# Patient Record
Sex: FEMALE | Race: WHITE | NOT HISPANIC OR LATINO | Employment: UNEMPLOYED | URBAN - METROPOLITAN AREA
[De-identification: names, ages, dates, MRNs, and addresses within clinical notes are randomized per-mention and may not be internally consistent; named-entity substitution may affect disease eponyms.]

---

## 2017-03-27 ENCOUNTER — HOSPITAL ENCOUNTER (OUTPATIENT)
Dept: ONCOLOGY | Facility: CLINIC | Age: 62
Discharge: HOME | End: 2017-03-27
Attending: INTERNAL MEDICINE | Admitting: INTERNAL MEDICINE

## 2017-03-27 LAB
ALBUMIN SERPL-MCNC: 4 G/DL (ref 3.5–4.8)
ALBUMIN/GLOB SERPL: 1.1 {RATIO} (ref 1–1.7)
ALP SERPL-CCNC: 57 IU/L (ref 32–91)
ALT SERPL-CCNC: 21 IU/L (ref 14–54)
ANION GAP SERPL CALC-SCNC: 16.3 MMOL/L (ref 10–20)
AST SERPL-CCNC: 29 IU/L (ref 15–41)
BILIRUB SERPL-MCNC: 0.4 MG/DL (ref 0.3–1.2)
BUN SERPL-MCNC: 10 MG/DL (ref 8–20)
BUN/CREAT SERPL: 14.3 (ref 5.4–26.2)
CALCIUM SERPL-MCNC: 10.1 MG/DL (ref 8.9–10.3)
CHLORIDE SERPL-SCNC: 103 MMOL/L (ref 101–111)
CONV CO2: 27 MMOL/L (ref 22–32)
CONV TOTAL PROTEIN: 7.5 G/DL (ref 6.1–7.9)
CREAT UR-MCNC: 0.7 MG/DL (ref 0.4–1)
GLOBULIN UR ELPH-MCNC: 3.5 G/DL (ref 2.5–3.8)
GLUCOSE SERPL-MCNC: 91 MG/DL (ref 65–99)
POTASSIUM SERPL-SCNC: 4.3 MMOL/L (ref 3.6–5.1)
SODIUM SERPL-SCNC: 142 MMOL/L (ref 136–144)

## 2017-06-02 ENCOUNTER — HOSPITAL ENCOUNTER (OUTPATIENT)
Dept: OTHER | Facility: HOSPITAL | Age: 62
Setting detail: SPECIMEN
Discharge: HOME | End: 2017-06-02
Attending: FAMILY MEDICINE | Admitting: FAMILY MEDICINE

## 2017-06-02 LAB
ALBUMIN SERPL-MCNC: 4.1 G/DL (ref 3.5–4.8)
ALBUMIN/GLOB SERPL: 1.3 {RATIO} (ref 1–1.7)
ALP SERPL-CCNC: 58 IU/L (ref 32–91)
ALT SERPL-CCNC: 18 IU/L (ref 14–54)
ANION GAP SERPL CALC-SCNC: 15.8 MMOL/L (ref 10–20)
AST SERPL-CCNC: 29 IU/L (ref 15–41)
BASOPHILS # BLD AUTO: 0 10*3/UL (ref 0–0.2)
BASOPHILS NFR BLD AUTO: 0 % (ref 0–2)
BILIRUB SERPL-MCNC: 0.5 MG/DL (ref 0.3–1.2)
BUN SERPL-MCNC: 8 MG/DL (ref 8–20)
BUN/CREAT SERPL: 11.4 (ref 5.4–26.2)
CALCIUM SERPL-MCNC: 9.9 MG/DL (ref 8.9–10.3)
CHLORIDE SERPL-SCNC: 103 MMOL/L (ref 101–111)
CHOLEST SERPL-MCNC: 213 MG/DL
CHOLEST/HDLC SERPL: 2.1 {RATIO}
CONV CO2: 26 MMOL/L (ref 22–32)
CONV LDL CHOLESTEROL DIRECT: 97 MG/DL (ref 0–100)
CONV TOTAL PROTEIN: 7.2 G/DL (ref 6.1–7.9)
CREAT UR-MCNC: 0.7 MG/DL (ref 0.4–1)
DIFFERENTIAL METHOD BLD: (no result)
EOSINOPHIL # BLD AUTO: 0.1 10*3/UL (ref 0–0.3)
EOSINOPHIL # BLD AUTO: 1 % (ref 0–3)
ERYTHROCYTE [DISTWIDTH] IN BLOOD BY AUTOMATED COUNT: 13 % (ref 11.5–14.5)
GLOBULIN UR ELPH-MCNC: 3.1 G/DL (ref 2.5–3.8)
GLUCOSE SERPL-MCNC: 94 MG/DL (ref 65–99)
HCT VFR BLD AUTO: 41.5 % (ref 35–49)
HDLC SERPL-MCNC: 104 MG/DL
HGB BLD-MCNC: 13.6 G/DL (ref 12–15)
LDLC/HDLC SERPL: 0.9 {RATIO}
LIPID INTERPRETATION: ABNORMAL
LYMPHOCYTES # BLD AUTO: 1.5 10*3/UL (ref 0.8–4.8)
LYMPHOCYTES NFR BLD AUTO: 19 % (ref 18–42)
MCH RBC QN AUTO: 32.6 PG (ref 26–32)
MCHC RBC AUTO-ENTMCNC: 32.8 G/DL (ref 32–36)
MCV RBC AUTO: 99.2 FL (ref 80–94)
MONOCYTES # BLD AUTO: 0.7 10*3/UL (ref 0.1–1.3)
MONOCYTES NFR BLD AUTO: 9 % (ref 2–11)
NEUTROPHILS # BLD AUTO: 5.3 10*3/UL (ref 2.3–8.6)
NEUTROPHILS NFR BLD AUTO: 71 % (ref 50–75)
NRBC BLD AUTO-RTO: 0 /100{WBCS}
NRBC/RBC NFR BLD MANUAL: 0 10*3/UL
PLATELET # BLD AUTO: 280 10*3/UL (ref 150–450)
PMV BLD AUTO: 9.3 FL (ref 7.4–10.4)
POTASSIUM SERPL-SCNC: 4.8 MMOL/L (ref 3.6–5.1)
RBC # BLD AUTO: 4.19 10*6/UL (ref 4–5.4)
SODIUM SERPL-SCNC: 140 MMOL/L (ref 136–144)
TRIGL SERPL-MCNC: 30 MG/DL
TSH SERPL-ACNC: 0.73 UIU/ML (ref 0.34–5.6)
VIT B12 SERPL-MCNC: 283 PG/ML (ref 180–914)
VLDLC SERPL CALC-MCNC: 12.4 MG/DL
WBC # BLD AUTO: 7.6 10*3/UL (ref 4.5–11.5)

## 2018-05-01 ENCOUNTER — HOSPITAL ENCOUNTER (OUTPATIENT)
Dept: ONCOLOGY | Facility: CLINIC | Age: 63
Setting detail: INFUSION SERIES
Discharge: HOME | End: 2018-05-01
Attending: INTERNAL MEDICINE | Admitting: INTERNAL MEDICINE

## 2018-05-01 ENCOUNTER — CLINICAL SUPPORT (OUTPATIENT)
Dept: ONCOLOGY | Facility: HOSPITAL | Age: 63
End: 2018-05-01

## 2018-05-01 NOTE — PROGRESS NOTES
PATIENTS ONCOLOGY RECORD LOCATED IN [facility]      Subjective     Name:  JESSICA WHEELER     Date:  2018  Address:  [address] [place] [address]  Home: [phone]  :  1955 AGE:  63 y.o.        RECORDS OBTAINED:  Patients Oncology Record is located in [facility]

## 2018-12-18 ENCOUNTER — HOSPITAL ENCOUNTER (OUTPATIENT)
Dept: LAB | Facility: HOSPITAL | Age: 63
Discharge: HOME | End: 2018-12-18
Attending: FAMILY MEDICINE | Admitting: FAMILY MEDICINE

## 2018-12-18 LAB
ALBUMIN SERPL-MCNC: 3.9 G/DL (ref 3.5–4.8)
ALBUMIN/GLOB SERPL: 1.3 {RATIO} (ref 1–1.7)
ALP SERPL-CCNC: 51 IU/L (ref 32–91)
ALT SERPL-CCNC: 18 IU/L (ref 14–54)
ANION GAP SERPL CALC-SCNC: 14 MMOL/L (ref 10–20)
AST SERPL-CCNC: 25 IU/L (ref 15–41)
BASOPHILS # BLD AUTO: 0.1 10*3/UL (ref 0–0.2)
BASOPHILS NFR BLD AUTO: 1 % (ref 0–2)
BILIRUB SERPL-MCNC: 0.3 MG/DL (ref 0.3–1.2)
BUN SERPL-MCNC: 9 MG/DL (ref 8–20)
BUN/CREAT SERPL: 12.9 (ref 5.4–26.2)
CALCIUM SERPL-MCNC: 9.2 MG/DL (ref 8.9–10.3)
CHLORIDE SERPL-SCNC: 104 MMOL/L (ref 101–111)
CHOLEST SERPL-MCNC: 234 MG/DL
CHOLEST/HDLC SERPL: 2.6 {RATIO}
CONV CO2: 23 MMOL/L (ref 22–32)
CONV LDL CHOLESTEROL DIRECT: 146 MG/DL (ref 0–100)
CONV TOTAL PROTEIN: 6.9 G/DL (ref 6.1–7.9)
CREAT UR-MCNC: 0.7 MG/DL (ref 0.4–1)
DIFFERENTIAL METHOD BLD: (no result)
EOSINOPHIL # BLD AUTO: 0.2 10*3/UL (ref 0–0.3)
EOSINOPHIL # BLD AUTO: 4 % (ref 0–3)
ERYTHROCYTE [DISTWIDTH] IN BLOOD BY AUTOMATED COUNT: 13 % (ref 11.5–14.5)
GLOBULIN UR ELPH-MCNC: 3 G/DL (ref 2.5–3.8)
GLUCOSE SERPL-MCNC: 94 MG/DL (ref 65–99)
HCT VFR BLD AUTO: 42.5 % (ref 35–49)
HDLC SERPL-MCNC: 90 MG/DL
HGB BLD-MCNC: 14.1 G/DL (ref 12–15)
LDLC/HDLC SERPL: 1.6 {RATIO}
LIPID INTERPRETATION: ABNORMAL
LYMPHOCYTES # BLD AUTO: 1.3 10*3/UL (ref 0.8–4.8)
LYMPHOCYTES NFR BLD AUTO: 22 % (ref 18–42)
MCH RBC QN AUTO: 32.4 PG (ref 26–32)
MCHC RBC AUTO-ENTMCNC: 33.1 G/DL (ref 32–36)
MCV RBC AUTO: 97.9 FL (ref 80–94)
MONOCYTES # BLD AUTO: 0.7 10*3/UL (ref 0.1–1.3)
MONOCYTES NFR BLD AUTO: 12 % (ref 2–11)
NEUTROPHILS # BLD AUTO: 3.6 10*3/UL (ref 2.3–8.6)
NEUTROPHILS NFR BLD AUTO: 61 % (ref 50–75)
NRBC BLD AUTO-RTO: 0 /100{WBCS}
NRBC/RBC NFR BLD MANUAL: 0 10*3/UL
PLATELET # BLD AUTO: 264 10*3/UL (ref 150–450)
PMV BLD AUTO: 7.4 FL (ref 7.4–10.4)
POTASSIUM SERPL-SCNC: 4 MMOL/L (ref 3.6–5.1)
RBC # BLD AUTO: 4.34 10*6/UL (ref 4–5.4)
SODIUM SERPL-SCNC: 137 MMOL/L (ref 136–144)
TRIGL SERPL-MCNC: 38 MG/DL
TSH SERPL-ACNC: 0.72 UIU/ML (ref 0.34–5.6)
VLDLC SERPL CALC-MCNC: 0 MG/DL
WBC # BLD AUTO: 5.9 10*3/UL (ref 4.5–11.5)

## 2018-12-19 LAB — 25(OH)D3 SERPL-MCNC: 11 NG/ML (ref 30–100)

## 2019-06-04 ENCOUNTER — CONVERSION ENCOUNTER (OUTPATIENT)
Dept: OTHER | Facility: HOSPITAL | Age: 64
End: 2019-06-04

## 2019-06-12 VITALS
OXYGEN SATURATION: 96 % | HEIGHT: 63 IN | HEART RATE: 92 BPM | SYSTOLIC BLOOD PRESSURE: 124 MMHG | BODY MASS INDEX: 17.19 KG/M2 | WEIGHT: 97 LBS | DIASTOLIC BLOOD PRESSURE: 76 MMHG

## 2019-06-13 NOTE — PROGRESS NOTES
Referring Provider:  Willie Leach Jr, MD  Primary Provider:  Hany ADAME MD    CC:  6 mo check up .    History of Present Illness:  Pt in for F/U Fibromyalgia w Anxiety and Depression.  Doing well in general.  Hx of Breast Ca w no Sn of recurrence.      Vital Signs:    Patient Profile:    64 Years Old Female  Height:     63 inches  Weight:     97 pounds  BMI:        17.18     O2 Sat:     96 %  Pulse rate: 92 / minute  BP Sittin / 76  (left arm)    Cuff size:  large      Problems: Active problems were reviewed with the patient during this visit.  Medications: Medications were reviewed with the patient during this visit.  Allergies: Allergies were reviewed with the patient during this visit.        Vitals Entered By: Echo Sheehan CMA (2019 1:53 PM)      Active Medications (reviewed today):  PNEUMOVAX 23 25 MCG/0.5ML INJECTION INJECTABLE (PNEUMOCOCCAL VAC POLYVALENT) Give injection IM x one by trained individual.  ZOLOFT 100 MG ORAL TABLET (SERTRALINE HCL) Take 1 1/2 tabs daily  ACYCLOVIR 800 MG ORAL TABLET (ACYCLOVIR) Take one tab q 8 hrs x 10 days for HSV outbreaks.  CELEBREX 200 MG ORAL CAPSULE (CELECOXIB) Take one cap daily for pain.  VITAMIN D TABLET (CHOLECALCIFEROL TABS)   CALCIUM CARBONATE TABLET (CALCIUM CARBONATE TABS)   TRAMADOL HCL 50 MG ORAL TABLET (TRAMADOL HCL) Take one (1) tablet by mouth two to  three times a day as needed for pain.  FOSAMAX 70 MG ORAL TABLET (ALENDRONATE SODIUM) 1 by mouth weekly    Current Allergies (reviewed today):  * CODEINE (Critical)          Risk Factors:     Smoked Tobacco Use:  Never smoker  Drug use:  no  HIV high-risk behavior:  no  Caffeine use:  <1 drinks per day  Alcohol use:  no  Exercise:  yes     Times per week:  2     Type of Exercise:  walking  Seatbelt use:  100 %  Sun Exposure:  occasionally    Family History Risk Factors:     Family History of MI in females < 65 years old:  no     Family History of MI in males < 55 years old:   no    Previous Tobacco Use: Signed On - 12/03/2018  Smoked Tobacco Use:  Never smoker  Drug use:  no  HIV high-risk behavior:  no  Caffeine use:  <1 drinks per day    Previous Alcohol Use: Signed On - 12/03/2018  Alcohol use:  no  Exercise:  yes     Times per week:  2     Type of Exercise:  walking  Seatbelt use:  100 %  Sun Exposure:  occasionally    Family History Risk Factors:     Family History of MI in females < 65 years old:  no     Family History of MI in males < 55 years old:  no            Blood Pressure:  Today's BP: 124/76 mm Hg    Labwork:   Most Recent Lab Results:   LDL: 146 mg/dL 12/18/2018  HbA1c: : 5.3 % 06/22/2016      Impression & Recommendations:    Problem # 1:  Fibromyalgia (ICD-729.1) (AEN08-I01.7)  Assessment: Unchanged    Problem # 2:  Osteoarthritis (ICD-715.90) (XKF36-Z59.90)  Assessment: Unchanged    Her updated medication list for this problem includes:     Celebrex 200 Mg Oral Capsule (Celecoxib) ..... Take one cap daily for pain.     Tramadol Hcl 50 Mg Oral Tablet (Tramadol hcl) ..... Take one (1) tablet by mouth two to  three times a day as needed for pain.      Problem # 3:  Anxiety with depression (ICD-300.4) (WMX07-R01.8)  Assessment: Improved    Problem # 4:  Hx of breast cancer (ICD-V10.3) (SAN04-Z74.3)  Assessment: Improved          Patient Instructions:  1)  Labs and Meds reviewed w no changes.  2)  Cont Healthy Heart Diet and Exercise w Vit D3 and Fosamax.  3)  F/U prn or in 6 mo w repeat Preventive Exam and Labs.    Medications:  ACYCLOVIR 800 MG ORAL TABLET (ACYCLOVIR) Take one tab q 8 hrs x 10 days for HSV outbreaks.  #30[Tablet] x 5      Entered and Authorized by:  Willie Leach Jr, MD      Electronically signed by:   Willie Leach Jr, MD on 06/04/2019      Method used:    [facility]               [facility] [place] [address]              [facility] [address]  [id number]              Ph: [phone]              Fax: [phone]      RxID:   [id number]  TRAMADOL HCL 50 MG ORAL TABLET (TRAMADOL HCL) Take one (1) tablet by mouth two to  three times a day as needed for pain.  #90 x 5      Entered and Authorized by:  Willie Leach Jr, MD      Electronically signed by:   Willie eLach Jr, MD on 06/04/2019      Method used:    Faxed to ...              [place] [id number]* (retail)              [address] [place] [address]  [id number]              Ph: [phone]              Fax: [phone]      Note to Pharmacy: Route: ORAL;       RxID:   [id number]  CELEBREX 200 MG ORAL CAPSULE (CELECOXIB) Take one cap daily for pain.  #90[Capsule] x 1      Entered and Authorized by:  Willie Leach Jr, MD      Electronically signed by:   Willie Leach Jr, MD on 06/04/2019      Method used:    Electronically to               [place] [id number]* ([facility])              [address] [place] [address]  [id number]              Ph: [phone]              Fax: [phone]      RxID:   [id number]                  Medication Administration  ]      Electronically signed by Willie Leach Jr, MD on 06/09/2019 at 9:55 PM  ________________________________________________________________________  Clinical Lists Changes    Orders:  [place] 47[address]-[id number] Vst-Est Level IV (CPT-37706) - Signed                          Electronically signed by Willie Leach Jr, MD on 07/08/2019 at 2:09 PM  ________________________________________________________________________       Disclaimer: Converted Note message may not contain all data elements that existed in the legacy source system. Please see [employer] for the original note details.

## 2019-07-02 ENCOUNTER — APPOINTMENT (OUTPATIENT)
Dept: ONCOLOGY | Facility: CLINIC | Age: 64
End: 2019-07-02

## 2019-07-02 ENCOUNTER — APPOINTMENT (OUTPATIENT)
Dept: LAB | Facility: HOSPITAL | Age: 64
End: 2019-07-02

## 2019-07-30 ENCOUNTER — OFFICE VISIT (OUTPATIENT)
Dept: ONCOLOGY | Facility: CLINIC | Age: 64
End: 2019-07-30

## 2019-07-30 ENCOUNTER — APPOINTMENT (OUTPATIENT)
Dept: LAB | Facility: HOSPITAL | Age: 64
End: 2019-07-30

## 2019-07-30 VITALS
DIASTOLIC BLOOD PRESSURE: 67 MMHG | HEIGHT: 63 IN | HEART RATE: 93 BPM | WEIGHT: 98.8 LBS | RESPIRATION RATE: 18 BRPM | TEMPERATURE: 98.4 F | BODY MASS INDEX: 17.5 KG/M2 | SYSTOLIC BLOOD PRESSURE: 115 MMHG

## 2019-07-30 DIAGNOSIS — Z85.3 HISTORY OF BILATERAL BREAST CANCER: Primary | ICD-10-CM

## 2019-07-30 DIAGNOSIS — Z15.01 BRCA2 GENE MUTATION POSITIVE IN FEMALE: ICD-10-CM

## 2019-07-30 DIAGNOSIS — M81.0 AGE-RELATED OSTEOPOROSIS WITHOUT CURRENT PATHOLOGICAL FRACTURE: ICD-10-CM

## 2019-07-30 DIAGNOSIS — Z15.09 BRCA2 GENE MUTATION POSITIVE IN FEMALE: ICD-10-CM

## 2019-07-30 DIAGNOSIS — Z15.02 BRCA2 GENE MUTATION POSITIVE IN FEMALE: ICD-10-CM

## 2019-07-30 LAB
ALBUMIN SERPL-MCNC: 4 G/DL (ref 3.5–4.8)
ALBUMIN/GLOB SERPL: 1.5 G/DL (ref 1–1.7)
ALP SERPL-CCNC: 44 U/L (ref 32–91)
ALT SERPL W P-5'-P-CCNC: 18 U/L (ref 14–54)
ANION GAP SERPL CALCULATED.3IONS-SCNC: 15.1 MMOL/L (ref 5–15)
AST SERPL-CCNC: 23 U/L (ref 15–41)
BILIRUB SERPL-MCNC: 0.5 MG/DL (ref 0.3–1.2)
BUN BLD-MCNC: 8 MG/DL (ref 8–20)
BUN/CREAT SERPL: 11.4 (ref 5.4–26.2)
CALCIUM SPEC-SCNC: 9.4 MG/DL (ref 8.9–10.3)
CHLORIDE SERPL-SCNC: 105 MMOL/L (ref 101–111)
CO2 SERPL-SCNC: 23 MMOL/L (ref 22–32)
CREAT BLD-MCNC: 0.7 MG/DL (ref 0.4–1)
GFR SERPL CREATININE-BSD FRML MDRD: 85 ML/MIN/1.73
GLOBULIN UR ELPH-MCNC: 2.7 GM/DL (ref 2.5–3.8)
GLUCOSE BLD-MCNC: 89 MG/DL (ref 65–99)
POTASSIUM BLD-SCNC: 4.1 MMOL/L (ref 3.6–5.1)
PROT SERPL-MCNC: 6.7 G/DL (ref 6.1–7.9)
SODIUM BLD-SCNC: 139 MMOL/L (ref 136–144)

## 2019-07-30 PROCEDURE — 36415 COLL VENOUS BLD VENIPUNCTURE: CPT | Performed by: INTERNAL MEDICINE

## 2019-07-30 PROCEDURE — 80053 COMPREHEN METABOLIC PANEL: CPT | Performed by: INTERNAL MEDICINE

## 2019-07-30 PROCEDURE — 99213 OFFICE O/P EST LOW 20 MIN: CPT | Performed by: INTERNAL MEDICINE

## 2019-07-30 RX ORDER — ACYCLOVIR 800 MG/1
TABLET ORAL
Refills: 0 | COMMUNITY
Start: 2019-06-04 | End: 2020-06-23 | Stop reason: SDUPTHER

## 2019-07-30 RX ORDER — TRAMADOL HYDROCHLORIDE 50 MG/1
50 TABLET ORAL EVERY 8 HOURS PRN
Refills: 0 | COMMUNITY
Start: 2019-07-20 | End: 2020-02-12 | Stop reason: SDUPTHER

## 2019-07-30 RX ORDER — ALENDRONATE SODIUM 70 MG/1
70 TABLET ORAL
COMMUNITY
Start: 2016-06-21 | End: 2020-02-12 | Stop reason: SDUPTHER

## 2019-07-30 RX ORDER — IBUPROFEN 200 MG
1 CAPSULE ORAL DAILY
COMMUNITY
Start: 2016-06-21

## 2019-07-30 RX ORDER — CELECOXIB 200 MG/1
200 CAPSULE ORAL DAILY
COMMUNITY
Start: 2016-06-21 | End: 2020-02-12 | Stop reason: SDUPTHER

## 2019-07-30 RX ORDER — SERTRALINE HYDROCHLORIDE 100 MG/1
200 TABLET, FILM COATED ORAL DAILY
Refills: 0 | COMMUNITY
Start: 2019-07-20

## 2019-07-30 NOTE — PROGRESS NOTES
Hematology/Oncology Outpatient Follow Up    PATIENT NAME:Charisma Maya  :1955  MRN: [id number]  PRIMARY CARE PHYSICIAN: Willie Leach Jr., MD  REFERRING PHYSICIAN: Willie Leach Jr.*    Chief Complaint   Patient presents with   • Follow-up     for breast cancer        HISTORY OF PRESENT ILLNESS:   1. Bilateral breast cancer diagnosed since .  • Ms. Maya claimed she developed cancer of the right breast in  when her only daughter was 6 months old.  She was treated with a mastectomy followed by chemotherapy consisting of CMF/-16 and tamoxifen.  The tamoxifen was discontinued in .  Her tumor in  was T2N1, ER/TX positive.  She was treated by Dr. Gila Cohen at that time before starting to see Dr. Luisa Cid, when Dr. Cohen left her practice.  She had a right chest wall recurrence in  that was treated with radiation therapy.  In , she developed a left breast cancer that was T1N0, ER positive, but TX negative.  She was treated with a mastectomy.  She then developed left chest wall recurrence in May 2002, HER-2/malissa 3+ strongly positive on subcutaneous nodule from the left chest.  This was treated with radiation therapy.  The patient did not receive chemotherapy per her choice.  Since then, she was followed by Dr. Luisa Cid and developed some change in the left lung that was being followed with CT scans and chest x-rays with suggestions of obtaining a PET scan last year.  As Dr. Cid left her practice, this was never followed through.  The patient saw Dr. Frost, after Dr. Cid left her practice, but she does not want to follow with him any longer.  The patient is being seen for continued follow up of her breast cancer.  • 6/3/04 - CMP (N), CEA < 0.5 (N), CA 27-29 20.7 (N).  • 04 - Chest x-ray, impression bilateral diffuse, increased lung markings consistent with chronic lung disease.  No definite evidence of tumor, mass, or metastatic disease in  the lungs seen.  PET scan, impression whole body nuclear PET imaging study is within normal limits.  • 4/18/05 - CEA < 0.4 (N), CA 27-29 was 8 (N).  • 8/4/05 - Chest x-ray, some fibrosis or scarring in the retrosternal region.  • 4/12/06 - CEA 0.9 (N), CA 27-29 was 17.01 (N).  • 11/14/06 - Chest x-ray, no change from 8/4/05.  Findings suggestive of COPD with scarring in the left, upper lobe.  • 5/23/07 - CEA (N) at 0.4, CA 27-29 (N) at 20, CMP (N).  • 1/7/08 - Chest x-ray with hyperinflated lungs are usually seen with emphysema.  Stable left upper lobe pulmonary scarring.  • 12/10/08 - CEA 1.0 (N), CA 27-29 16 (N).  • 12/3/09 - CA 27-29 12 (N).  • 12/3/09 - Chest x-ray bilateral calcifications.  • 10/20/10 - CA 27-29 (N) 19.  • 11/1/11 - CA 27-29 13 (N).  • 11/28/11 - Chest x-ray with no change from January 2008.  • 6/12/12 - CT of the lumbar spine revealed bulging of the annulus fibrosus below from L3 through S1 at L3-L4 and to a greater extent at L4-L5.  These changes are associated with some ligamentum flavum hypertrophy causing central canal stenosis and L3 and L4 and again slightly greater at L4 and L5.  • 11/15/13 - CT of the chest revealed two small focal bony protrusions and prominence of the pancreatic duct.  Recommended the MRI or CT for further evaluation.  Asymmetric biapical opacity, with greater focal opacity in the apical portion of the right upper lobe.  Given the scarring in the chest and bronchiectasis in this location, this all represents scar.  However recommended follow-up studies to ensure stability.  Tiny 3 mm nodule in the right middle lobe.  Focal air-space opacity in the left upper lobe may be related to post-radiation scar.  • 11/29/13 - MRI of the abdomen revealed no evidence of pancreatic mass, lesions, surrounding inflammation or lymphadenopathy.  No evidence of active disease in the abdomen.  Right kidney to PTOTIC, which could be seen as a variation of anatomy.  • 12/16/14 - CT chest  without contrast with stable 3 mm nodule in the lateral segment of the middle lobe since 11/15/13. Apical pleural parenchymal scarring bilaterally likely at least in part post-treatment change, unchanged since 11/15/13.   • 12/22/14 - Comprehensive metabolic panel with no significant abnormality. TSH 1.386 (0.4-4.5). BRCA 1 and 2 testing revealed the patient to have BRCA 2 heterozygous mutation. Patient advised on genetic counseling.    • 6/21/16 - Comprehensive metabolic panel normal.   • 3/27/17 - WBC 7.5, hemoglobin 14, platelet count 313,000. Patient has decided not to be seen at [facility]. Went over results of her genetic testing again. I told her that she was heterozygous for mutation BRCA-2, consistent with hereditary breast and ovarian cancer syndrome. Patient made aware that lifetime cancer risk due to BRCA-2 mutation was approximately 41-84% and 11-27% risk of ovarian cancer with 5-7% risk of pancreatic cancer. NCCN guidelines were reviewed and patient was offered genetic testing for family members, as her first-degree relatives would have a 50% chance of testing positive for the mutation. She was offered contact at the testing facility for further information if needed.   • 5/1/18 - Review of labs from 6/2/17 ordered by Willie Leach Jr., M.D. revealed Vitamin B12 of 283 (180-914).  ().    2. Osteoporosis diagnosis established December 2007.  • 12/19/07 - DEXA scan of the lumbar spine revealed osteoporosis and of the left hip revealed osteopenia.  • 6/12/12 - CT of the lumbar spine revealed bulging of the annulus fibrosis below from L3 through S1 at L3-L4 and to a greater extent at L4-L5.  These changes are associated with some ligamentum flavum hypertrophy causing central canal stenosis and L3 and L4 and again slightly greater at L4 and L5.  • 4/8/13 - DEXA scan revealed osteoporosis of the hip and spine.    • 11/11 - Patient treated with Reclast 5 mg IV yearly.  • 11/20/13 -  The patient states that the Reclast was stopped by Dr. Villanueva and was restarted on Fosamax.  • 3/21/16 - Patient forgot to have follow-up DEXA scan done.   • 4/12/16 - DEXA scan with osteoporosis of the left hip with max T-score of -2.7 in the neck.   • 3/27/17 - Patient recommended to start back on Reclast. She will discuss that with her PCP.  • 12/18/18 vitamin D 11 ().  Followed by Dr. Riggs.  3. Lung nodules diagnosis established in November of 2013.  • 11/15/13 - CT of the chest revealed two small focal bony protrusions and prominence of the pancreatic duct.  Recommended the MRI or CT for further evaluation.  Asymmetric biapical opacity, with greater focal opacity in the apical portion of the right upper lobe.  Given the scarring in the chest and bronchiectasis in this location, this all represents scar.  However recommended follow-up studies to ensure stability.  Tiny 3 mm nodule in the right middle lobe.  Focal air-space opacity in the left upper lobe may be related to postradiation scar.  • 12/16/14 - CT scan of the chest revealed a stable 3 mm nodule in the lateral segment of the middle lobe, stable since 11/15/13.  Apical pleural parenchyma scarring bilaterally, likely post treatment change, unchanged from 11/15/13.    • 12/22/14 - Discussed result of the CT scan, stable since 2013.  Will check chest x-ray yearly.  • 3/21/16 - Chest x-ray with emphysematous changes noted with superimposed more chronic-appearing changes within the lungs.   • 3/27/17 - Chest x-ray with no acute disease in the chest and no significant change since previous study with chronic lung disease and emphysema.     Past Medical History:   Diagnosis Date   • Breast cancer, BRCA2 positive (CMS/HCC)     BRCA-2 heterozygosity-Bilateral   • Chronic lung disease    • Disorder of vitamin B12     Borderline B12 level   • Emphysema lung (CMS/HCC)    • History of kidney stones    • Migraine headache    • Osteonecrosis (CMS/HCC) 2015     bilateral knees, osteonerosis possibly related to chronic steroid use   • Osteoporosis 2002   • Osteosclerosis    • Seasonal allergies    • Ulcerative colitis (CMS/HCC)        Past Surgical History:   Procedure Laterality Date   • CARPAL TUNNEL RELEASE Bilateral 2000   • CHEST WALL MASS EXCISION Right 04/1993    2 knots removed from chest wall   • COLECTOMY PARTIAL / TOTAL  02/1992    and ileostomy by Dr. Goldstein because of rupture of bowel Feb. 1992   • CREATION / REVISION OF ILEOSTOMY / JEJUNOSTOMY      reversal of ileostomy and J-pouch placement-Dr. Sesay   • IMPLANTATION / REPLACEMENT INFUSION PUMP  2002    placement and removal in 2002   • MASTECTOMY Bilateral    • OOPHORECTOMY  1996    Uterus intact   • TRIGGER FINGER RELEASE Right 2002    right thumb trigger release   • TUBAL ABDOMINAL LIGATION  1991         Current Outpatient Medications:   •  acyclovir (ZOVIRAX) 800 MG tablet, take 1 tablet by mouth every 8 hours for 10 days HSV OUTBREAKS, Disp: , Rfl: 0  •  alendronate (FOSAMAX) 70 MG tablet, FOSAMAX 70 MG TABS, Disp: , Rfl:   •  calcium carbonate (OS-TOSIN) 1250 (500 Ca) MG tablet, CALCIUM CARBONATE TABS, Disp: , Rfl:   •  celecoxib (CELEBREX) 200 MG capsule, CELEBREX 200 MG CAPS, Disp: , Rfl:   •  Cholecalciferol 1000 units tablet, VITAMIN D TABS, Disp: , Rfl:   •  diphenhydrAMINE HCl (BENADRYL ALLERGY PO), Take  by mouth Daily As Needed., Disp: , Rfl:   •  sertraline (ZOLOFT) 100 MG tablet, , Disp: , Rfl: 0  •  traMADol (ULTRAM) 50 MG tablet, , Disp: , Rfl: 0    Allergies   Allergen Reactions   • Codeine Nausea Only       Family History   Problem Relation Age of Onset   • Breast cancer Mother         passed away from metastasis       Cancer-related family history includes Breast cancer in her mother.    Social History     Tobacco Use   • Smoking status: Never Smoker   Substance Use Topics   • Alcohol use: No     Frequency: Never   • Drug use: No       I have reviewed the history of present illness,  "past medical history, family history, social history, lab results, all notes and other records since the patient was last seen on 5/1/18.    SUBJECTIVE: Patient is here for follow up of bilateral breast cancer and BRCA 2 hetro.     Female accompanied patient today.   SUNDAY Brock present during office visit.         REVIEW OF SYSTEMS:  Review of Systems   Constitutional: Negative for chills and fever.   HENT: Negative for ear pain, mouth sores, nosebleeds and sore throat.    Eyes: Negative for photophobia and visual disturbance.   Respiratory: Negative for wheezing and stridor.    Cardiovascular: Negative for chest pain and palpitations.   Gastrointestinal: Negative for abdominal pain, diarrhea, nausea and vomiting.   Endocrine: Negative for cold intolerance and heat intolerance.   Genitourinary: Negative for dysuria and hematuria.   Musculoskeletal: Negative for joint swelling and neck stiffness.   Skin: Negative for color change and rash.   Neurological: Negative for seizures and syncope.   Hematological: Negative for adenopathy.        No obvious bleeding   Psychiatric/Behavioral: Negative for agitation, confusion and hallucinations.       OBJECTIVE:    Vitals:    07/30/19 1451   BP: 115/67   Pulse: 93   Resp: 18   Temp: 98.4 °F (36.9 °C)   Weight: 44.8 kg (98 lb 12.8 oz)   Height: 160 cm (63\")   PainSc: 0-No pain       ECOG  (0) Fully active, able to carry on all predisease performance without restriction    Physical Exam   Constitutional: She is oriented to person, place, and time. No distress.   HENT:   Head: Normocephalic and atraumatic.   Dental fillings.   Eyes: Conjunctivae and EOM are normal. Right eye exhibits no discharge. Left eye exhibits no discharge. No scleral icterus.   Eyeglasses present.    Neck: Normal range of motion. Neck supple. No thyromegaly present.   Cardiovascular: Normal rate, regular rhythm and normal heart sounds. Exam reveals no gallop and no friction rub.   Pulmonary/Chest: " Effort normal. No stridor. No respiratory distress. She has no wheezes.   Abdominal: Soft. Bowel sounds are normal. She exhibits no mass. There is no tenderness. There is no rebound and no guarding.   Musculoskeletal: Normal range of motion. She exhibits no tenderness.   Lymphadenopathy:     She has no cervical adenopathy.   Neurological: She is alert and oriented to person, place, and time. She exhibits normal muscle tone.   Skin: Skin is warm. No rash noted. She is not diaphoretic. No erythema.   Psychiatric: She has a normal mood and affect. Her behavior is normal.   Nursing note and vitals reviewed.      RECENT LABS  WBC   Date Value Ref Range Status   12/18/2018 5.9 4.5 - 11.5 10*3/uL Final     RBC   Date Value Ref Range Status   12/18/2018 4.34 4.00 - 5.40 10*6/uL Final     Hemoglobin   Date Value Ref Range Status   12/18/2018 14.1 12.0 - 15.0 g/dL Final     Hematocrit   Date Value Ref Range Status   12/18/2018 42.5 35 - 49 % Final     MCV   Date Value Ref Range Status   12/18/2018 97.9 (H) 80 - 94 fL Final     MCH   Date Value Ref Range Status   12/18/2018 32.4 (H) 26 - 32 pg Final     MCHC   Date Value Ref Range Status   12/18/2018 33.1 32 - 36 g/dL Final     RDW   Date Value Ref Range Status   12/18/2018 13.0 11.5 - 14.5 % Final     MPV   Date Value Ref Range Status   12/18/2018 7.4 7.4 - 10.4 fL Final     Platelets   Date Value Ref Range Status   12/18/2018 264 150 - 450 10*3/uL Final     Neutrophil Rel %   Date Value Ref Range Status   12/18/2018 61 50 - 75 % Final     Lymphocyte Rel %   Date Value Ref Range Status   12/18/2018 22 18 - 42 % Final     Monocyte Rel %   Date Value Ref Range Status   12/18/2018 12 (H) 2 - 11 % Final     Eosinophil Rel %   Date Value Ref Range Status   12/18/2018 4 (H) 0 - 3 % Final     Basophil Rel %   Date Value Ref Range Status   12/18/2018 1 0 - 2 % Final     Neutrophils Absolute   Date Value Ref Range Status   12/18/2018 3.6 2.3 - 8.6 10*3/uL Final     Lymphocytes  Absolute   Date Value Ref Range Status   12/18/2018 1.3 0.8 - 4.8 10*3/uL Final     Monocytes Absolute   Date Value Ref Range Status   12/18/2018 0.7 0.1 - 1.3 10*3/uL Final     Eosinophils Absolute   Date Value Ref Range Status   12/18/2018 0.2 0.0 - 0.3 10*3/uL Final     Basophils Absolute   Date Value Ref Range Status   12/18/2018 0.1 0 - 0.2 10*3/uL Final     nRBC   Date Value Ref Range Status   12/18/2018 0 0 /100[WBCs] Final       Lab Results   Component Value Date    GLUCOSE 94 12/18/2018    BUN 9 12/18/2018    CREATININE 0.7 12/18/2018    BCR 12.9 12/18/2018    K 4.0 12/18/2018    CO2 23 12/18/2018    CALCIUM 9.2 12/18/2018    ALBUMIN 3.9 12/18/2018    LABIL2 1.3 12/18/2018    AST 25 12/18/2018    ALT 18 12/18/2018         Assessment/Plan     History of bilateral breast cancer  - CBC & Differential  - Comprehensive Metabolic Panel  - Comprehensive Metabolic Panel    BRCA2 gene mutation positive in female hetro    Age-related osteoporosis without current pathological fracture      ASSESSMENT:    PLAN:  Check CBC and CMP.  Yearly exams with labs.       I have reviewed labs results, imaging, vitals, and medications with the patient today. Will follow up in 1 year.     Patient verbalized understanding and is in agreement of the above plan.    I have reviewed and agree with the above information.   Dao Rebolledo M.D., F.A.C.P.       Much of the above report is an electronic transcription/translation of the spoken language to printed text using Dragon Software. As such, the subtleties and finesse of the spoken language may permit erroneous, or at times, nonsensical words or phrases to be inadvertently transcribed; thus changes may be made at a later date to rectify these errors.

## 2020-02-12 ENCOUNTER — OFFICE VISIT (OUTPATIENT)
Dept: FAMILY MEDICINE CLINIC | Facility: CLINIC | Age: 65
End: 2020-02-12

## 2020-02-12 VITALS
HEART RATE: 104 BPM | SYSTOLIC BLOOD PRESSURE: 122 MMHG | OXYGEN SATURATION: 97 % | WEIGHT: 102 LBS | HEIGHT: 63 IN | BODY MASS INDEX: 18.07 KG/M2 | DIASTOLIC BLOOD PRESSURE: 79 MMHG

## 2020-02-12 DIAGNOSIS — M19.90 ARTHRITIS: ICD-10-CM

## 2020-02-12 DIAGNOSIS — M81.0 AGE-RELATED OSTEOPOROSIS WITHOUT CURRENT PATHOLOGICAL FRACTURE: Primary | ICD-10-CM

## 2020-02-12 DIAGNOSIS — Z85.3 HISTORY OF BREAST CANCER: ICD-10-CM

## 2020-02-12 PROCEDURE — 99214 OFFICE O/P EST MOD 30 MIN: CPT | Performed by: NURSE PRACTITIONER

## 2020-02-12 RX ORDER — TRAMADOL HYDROCHLORIDE 50 MG/1
50 TABLET ORAL EVERY 8 HOURS PRN
Qty: 60 TABLET | Refills: 2 | Status: SHIPPED | OUTPATIENT
Start: 2020-02-12 | End: 2020-05-14

## 2020-02-12 RX ORDER — CELECOXIB 200 MG/1
200 CAPSULE ORAL DAILY
Qty: 30 CAPSULE | Refills: 11 | Status: SHIPPED | OUTPATIENT
Start: 2020-02-12 | End: 2021-03-17 | Stop reason: SDUPTHER

## 2020-02-12 RX ORDER — ALENDRONATE SODIUM 70 MG/1
70 TABLET ORAL
Qty: 4 TABLET | Refills: 11 | Status: SHIPPED | OUTPATIENT
Start: 2020-02-12 | End: 2021-03-17 | Stop reason: SDUPTHER

## 2020-02-27 ENCOUNTER — LAB (OUTPATIENT)
Dept: LAB | Facility: HOSPITAL | Age: 65
End: 2020-02-27

## 2020-02-27 DIAGNOSIS — M81.0 AGE-RELATED OSTEOPOROSIS WITHOUT CURRENT PATHOLOGICAL FRACTURE: ICD-10-CM

## 2020-02-27 LAB
ALBUMIN SERPL-MCNC: 4.4 G/DL (ref 3.5–5.2)
ALBUMIN/GLOB SERPL: 1.6 G/DL
ALP SERPL-CCNC: 52 U/L (ref 39–117)
ALT SERPL W P-5'-P-CCNC: 16 U/L (ref 1–33)
ANION GAP SERPL CALCULATED.3IONS-SCNC: 12.7 MMOL/L (ref 5–15)
AST SERPL-CCNC: 22 U/L (ref 1–32)
BILIRUB SERPL-MCNC: 0.3 MG/DL (ref 0.2–1.2)
BUN BLD-MCNC: 12 MG/DL (ref 8–23)
BUN/CREAT SERPL: 20.7 (ref 7–25)
CALCIUM SPEC-SCNC: 9.1 MG/DL (ref 8.6–10.5)
CHLORIDE SERPL-SCNC: 103 MMOL/L (ref 98–107)
CHOLEST SERPL-MCNC: 239 MG/DL (ref 0–200)
CO2 SERPL-SCNC: 23.3 MMOL/L (ref 22–29)
CREAT BLD-MCNC: 0.58 MG/DL (ref 0.57–1)
DEPRECATED RDW RBC AUTO: 39.4 FL (ref 37–54)
ERYTHROCYTE [DISTWIDTH] IN BLOOD BY AUTOMATED COUNT: 11.4 % (ref 12.3–15.4)
GFR SERPL CREATININE-BSD FRML MDRD: 105 ML/MIN/1.73
GLOBULIN UR ELPH-MCNC: 2.8 GM/DL
GLUCOSE BLD-MCNC: 87 MG/DL (ref 65–99)
HCT VFR BLD AUTO: 39.6 % (ref 34–46.6)
HDLC SERPL-MCNC: 85 MG/DL (ref 40–60)
HGB BLD-MCNC: 13.2 G/DL (ref 12–15.9)
LDLC SERPL CALC-MCNC: 144 MG/DL (ref 0–100)
LDLC/HDLC SERPL: 1.7 {RATIO}
MCH RBC QN AUTO: 31.9 PG (ref 26.6–33)
MCHC RBC AUTO-ENTMCNC: 33.3 G/DL (ref 31.5–35.7)
MCV RBC AUTO: 95.7 FL (ref 79–97)
PLATELET # BLD AUTO: 280 10*3/MM3 (ref 140–450)
PMV BLD AUTO: 9.5 FL (ref 6–12)
POTASSIUM BLD-SCNC: 4.3 MMOL/L (ref 3.5–5.2)
PROT SERPL-MCNC: 7.2 G/DL (ref 6–8.5)
RBC # BLD AUTO: 4.14 10*6/MM3 (ref 3.77–5.28)
SODIUM BLD-SCNC: 139 MMOL/L (ref 136–145)
TRIGL SERPL-MCNC: 48 MG/DL (ref 0–150)
TSH SERPL DL<=0.05 MIU/L-ACNC: 1.2 UIU/ML (ref 0.27–4.2)
VLDLC SERPL-MCNC: 9.6 MG/DL (ref 5–40)
WBC NRBC COR # BLD: 6.86 10*3/MM3 (ref 3.4–10.8)

## 2020-02-27 PROCEDURE — 80053 COMPREHEN METABOLIC PANEL: CPT

## 2020-02-27 PROCEDURE — 80061 LIPID PANEL: CPT

## 2020-02-27 PROCEDURE — 84443 ASSAY THYROID STIM HORMONE: CPT

## 2020-02-27 PROCEDURE — 85027 COMPLETE CBC AUTOMATED: CPT

## 2020-02-27 PROCEDURE — 36415 COLL VENOUS BLD VENIPUNCTURE: CPT

## 2020-02-28 RX ORDER — ROSUVASTATIN CALCIUM 10 MG/1
10 TABLET, COATED ORAL NIGHTLY
Qty: 90 TABLET | Refills: 1 | Status: SHIPPED | OUTPATIENT
Start: 2020-02-28 | End: 2021-03-17

## 2020-03-02 DIAGNOSIS — E78.5 HYPERLIPIDEMIA, UNSPECIFIED HYPERLIPIDEMIA TYPE: Primary | ICD-10-CM

## 2020-05-12 DIAGNOSIS — M19.90 ARTHRITIS: ICD-10-CM

## 2020-05-14 RX ORDER — TRAMADOL HYDROCHLORIDE 50 MG/1
TABLET ORAL
Qty: 60 TABLET | Refills: 2 | Status: SHIPPED | OUTPATIENT
Start: 2020-05-14 | End: 2020-08-12 | Stop reason: SDUPTHER

## 2020-06-23 RX ORDER — ACYCLOVIR 800 MG/1
TABLET ORAL
Qty: 30 TABLET | Refills: 0 | Status: SHIPPED | OUTPATIENT
Start: 2020-06-23 | End: 2021-10-05 | Stop reason: SDUPTHER

## 2020-08-12 ENCOUNTER — OFFICE VISIT (OUTPATIENT)
Dept: FAMILY MEDICINE CLINIC | Facility: CLINIC | Age: 65
End: 2020-08-12

## 2020-08-12 VITALS
WEIGHT: 99.8 LBS | SYSTOLIC BLOOD PRESSURE: 117 MMHG | OXYGEN SATURATION: 98 % | TEMPERATURE: 97.3 F | HEIGHT: 63 IN | BODY MASS INDEX: 17.68 KG/M2 | DIASTOLIC BLOOD PRESSURE: 71 MMHG | HEART RATE: 86 BPM

## 2020-08-12 DIAGNOSIS — M19.90 ARTHRITIS: ICD-10-CM

## 2020-08-12 DIAGNOSIS — M81.0 AGE-RELATED OSTEOPOROSIS WITHOUT CURRENT PATHOLOGICAL FRACTURE: Primary | ICD-10-CM

## 2020-08-12 DIAGNOSIS — E78.5 HYPERLIPIDEMIA, UNSPECIFIED HYPERLIPIDEMIA TYPE: ICD-10-CM

## 2020-08-12 DIAGNOSIS — Z85.3 HISTORY OF BREAST CANCER: ICD-10-CM

## 2020-08-12 DIAGNOSIS — F32.A DEPRESSION, UNSPECIFIED DEPRESSION TYPE: ICD-10-CM

## 2020-08-12 PROCEDURE — 99214 OFFICE O/P EST MOD 30 MIN: CPT | Performed by: NURSE PRACTITIONER

## 2020-08-12 RX ORDER — TRAMADOL HYDROCHLORIDE 50 MG/1
50 TABLET ORAL EVERY 8 HOURS PRN
Qty: 60 TABLET | Refills: 2 | Status: SHIPPED | OUTPATIENT
Start: 2020-08-12 | End: 2020-11-13

## 2020-08-12 NOTE — PROGRESS NOTES
Chief Complaint   Patient presents with   • Osteoporosis   • Follow-up     6 month   • Med Refill       HPI    Hx of Breast cancer B mastectomy and returned in chest wall, radiation/chemo caused bowel issues, illeostomy, has J pouch now. Follows onc with Dr. Palomares yearly, gastro every 2 years     Arthritis, ankles, hips, knees. On celebrex daily and tramadol prn. Saw rhuem in past     OP, on fosamax and calcium, last dexa >2 years ago    Hyperlipidemia, pt did not start crestor, would rather not start a statin, needs to have labs updated, she denies constipation, diarrhea, GI upset, fatigue, chest pain/pressure, exercise intolerance, dyspnea, palpitations, syncope and pedal edema.       The following portions of the patient's history were reviewed and updated as appropriate: allergies, current medications, past family history, past medical history, past social history, past surgical history and problem list.    Past Medical History:   Diagnosis Date   • Breast cancer, BRCA2 positive (CMS/[facility] [place])    • Chronic lung disease    • Disorder of vitamin B12    • Emphysema lung (CMS/HCC)    • History of kidney stones    • Migraine headache    • Osteonecrosis (CMS/HCC) 2015   • Osteoporosis 2002   • Osteosclerosis    • Seasonal allergies    • Ulcerative colitis (CMS/HCC)      Past Surgical History:   Procedure Laterality Date   • CARPAL TUNNEL RELEASE Bilateral 2000   • CHEST WALL MASS EXCISION Right 04/1993    2 knots removed from chest wall   • COLECTOMY PARTIAL / TOTAL  02/1992    and ileostomy by Dr. Goldstein because of rupture of bowel Feb. 1992   • CREATION / REVISION OF ILEOSTOMY / JEJUNOSTOMY      reversal of ileostomy and J-pouch placement-Dr. Sesay   • IMPLANTATION / REPLACEMENT INFUSION PUMP  2002    placement and removal in 2002   • MASTECTOMY Bilateral    • OOPHORECTOMY  1996    Uterus intact   • TRIGGER FINGER RELEASE Right 2002    right thumb trigger release   • TUBAL ABDOMINAL LIGATION  1991     Family  "History   Problem Relation Age of Onset   • Breast cancer Mother         passed away from metastasis     Social History     Tobacco Use   • Smoking status: Never Smoker   Substance Use Topics   • Alcohol use: No     Frequency: Never         Current Outpatient Medications:   •  acyclovir (ZOVIRAX) 800 MG tablet, TAKE 1 TABLET BY MOUTH EVERY 8 HOURS FOR 10 DAYS FOR HSV OUTBREAKS., Disp: 30 tablet, Rfl: 0  •  alendronate (FOSAMAX) 70 MG tablet, Take 1 tablet by mouth Every 7 (Seven) Days., Disp: 4 tablet, Rfl: 11  •  calcium carbonate (OS-TOSIN) 1250 (500 Ca) MG tablet, Take 1 tablet by mouth Daily., Disp: , Rfl:   •  celecoxib (CELEBREX) 200 MG capsule, Take 1 capsule by mouth Daily., Disp: 30 capsule, Rfl: 11  •  Cholecalciferol 1000 units tablet, Take 1,000 Units by mouth Daily., Disp: , Rfl:   •  diphenhydrAMINE HCl (BENADRYL ALLERGY PO), Take  by mouth Daily As Needed., Disp: , Rfl:   •  rosuvastatin (CRESTOR) 10 MG tablet, Take 1 tablet by mouth Every Night., Disp: 90 tablet, Rfl: 1  •  sertraline (ZOLOFT) 100 MG tablet, Take 100 mg by mouth Daily. #45, Disp: , Rfl: 0  •  traMADol (ULTRAM) 50 MG tablet, Take 1 tablet by mouth Every 8 (Eight) Hours As Needed for Severe Pain ., Disp: 60 tablet, Rfl: 2      Review of Systems       Obtained as mentioned in HPI, otherwise negative.       Vitals:    08/12/20 1610   BP: 117/71   BP Location: Left arm   Patient Position: Sitting   Cuff Size: Small Adult   Pulse: 86   Temp: 97.3 °F (36.3 °C)   TempSrc: Skin   SpO2: 98%   Weight: 45.3 kg (99 lb 12.8 oz)   Height: 160 cm (62.99\")     Body mass index is 17.68 kg/m².    Physical Exam   Constitutional: She is oriented to person, place, and time. She appears well-developed and well-nourished. No distress.   Eyes: Pupils are equal, round, and reactive to light.   Neck: Normal range of motion. Neck supple. No JVD present. No thyromegaly present.   Cardiovascular: Normal rate, regular rhythm, normal heart sounds and intact distal " pulses.   No murmur heard.  Pulmonary/Chest: Effort normal and breath sounds normal. No respiratory distress.   Abdominal: Soft. Bowel sounds are normal. She exhibits no distension. There is no tenderness.   Musculoskeletal: Normal range of motion. She exhibits tenderness (multi joint arthritis, ttp, good rom).   Neurological: She is alert and oriented to person, place, and time. No sensory deficit.   Skin: Skin is warm and dry. She is not diaphoretic.   Psychiatric: Her behavior is normal. Judgment and thought content normal. Her mood appears not anxious. She exhibits a depressed mood.   Nursing note and vitals reviewed.      No visits with results within 7 Day(s) from this visit.   Latest known visit with results is:   Lab on 02/27/2020   Component Date Value Ref Range Status   • Glucose 02/27/2020 87  65 - 99 mg/dL Final   • BUN 02/27/2020 12  8 - 23 mg/dL Final   • Creatinine 02/27/2020 0.58  0.57 - 1.00 mg/dL Final   • Sodium 02/27/2020 139  136 - 145 mmol/L Final   • Potassium 02/27/2020 4.3  3.5 - 5.2 mmol/L Final   • Chloride 02/27/2020 103  98 - 107 mmol/L Final   • CO2 02/27/2020 23.3  22.0 - 29.0 mmol/L Final   • Calcium 02/27/2020 9.1  8.6 - 10.5 mg/dL Final   • Total Protein 02/27/2020 7.2  6.0 - 8.5 g/dL Final   • Albumin 02/27/2020 4.40  3.50 - 5.20 g/dL Final   • ALT (SGPT) 02/27/2020 16  1 - 33 U/L Final   • AST (SGOT) 02/27/2020 22  1 - 32 U/L Final   • Alkaline Phosphatase 02/27/2020 52  39 - 117 U/L Final   • Total Bilirubin 02/27/2020 0.3  0.2 - 1.2 mg/dL Final   • eGFR Non African Amer 02/27/2020 105  >60 mL/min/1.73 Final   • Globulin 02/27/2020 2.8  gm/dL Final   • A/G Ratio 02/27/2020 1.6  g/dL Final   • BUN/Creatinine Ratio 02/27/2020 20.7  7.0 - 25.0 Final   • Anion Gap 02/27/2020 12.7  5.0 - 15.0 mmol/L Final   • WBC 02/27/2020 6.86  3.40 - 10.80 10*3/mm3 Final   • RBC 02/27/2020 4.14  3.77 - 5.28 10*6/mm3 Final   • Hemoglobin 02/27/2020 13.2  12.0 - 15.9 g/dL Final   • Hematocrit  02/27/2020 39.6  34.0 - 46.6 % Final   • MCV 02/27/2020 95.7  79.0 - 97.0 fL Final   • MCH 02/27/2020 31.9  26.6 - 33.0 pg Final   • MCHC 02/27/2020 33.3  31.5 - 35.7 g/dL Final   • RDW 02/27/2020 11.4* 12.3 - 15.4 % Final   • RDW-SD 02/27/2020 39.4  37.0 - 54.0 fl Final   • MPV 02/27/2020 9.5  6.0 - 12.0 fL Final   • Platelets 02/27/2020 280  140 - 450 10*3/mm3 Final   • Total Cholesterol 02/27/2020 239* 0 - 200 mg/dL Final   • Triglycerides 02/27/2020 48  0 - 150 mg/dL Final   • HDL Cholesterol 02/27/2020 85* 40 - 60 mg/dL Final   • LDL Cholesterol  02/27/2020 144* 0 - 100 mg/dL Final   • VLDL Cholesterol 02/27/2020 9.6  5 - 40 mg/dL Final   • LDL/HDL Ratio 02/27/2020 1.70   Final   • TSH 02/27/2020 1.200  0.270 - 4.200 uIU/mL Final       Diagnoses and all orders for this visit:    1. Age-related osteoporosis without current pathological fracture (Primary)    2. Arthritis  -     traMADol (ULTRAM) 50 MG tablet; Take 1 tablet by mouth Every 8 (Eight) Hours As Needed for Severe Pain .  Dispense: 60 tablet; Refill: 2    3. Hyperlipidemia, unspecified hyperlipidemia type    4. History of breast cancer    5. Depression, unspecified depression type        Keep f/u with Oncology yearly  dexa unchanged 2/2020, cont fosamax  Arthritis pain stable, rf tramadol for prn use.   Needs labs updated prior to next appt, she has improved diet, has not started crestor yet.   rtc in 6mo or prn

## 2020-11-13 ENCOUNTER — TELEPHONE (OUTPATIENT)
Dept: FAMILY MEDICINE CLINIC | Facility: CLINIC | Age: 65
End: 2020-11-13

## 2020-11-13 DIAGNOSIS — M19.90 ARTHRITIS: ICD-10-CM

## 2020-11-13 RX ORDER — TRAMADOL HYDROCHLORIDE 50 MG/1
TABLET ORAL
Qty: 60 TABLET | Refills: 2 | Status: SHIPPED | OUTPATIENT
Start: 2020-11-13 | End: 2021-02-15

## 2020-11-13 NOTE — TELEPHONE ENCOUNTER
[facility] pharmacy calling stated that they received the script for Tramadol and they are asking if the #60 is supposed to last 30 days or only 20 days. The directions say to take every 8 hours as needed for pain. Please advise.     Thank you

## 2020-12-09 ENCOUNTER — LAB (OUTPATIENT)
Dept: FAMILY MEDICINE CLINIC | Facility: CLINIC | Age: 65
End: 2020-12-09

## 2020-12-09 DIAGNOSIS — E78.5 HYPERLIPIDEMIA, UNSPECIFIED HYPERLIPIDEMIA TYPE: ICD-10-CM

## 2020-12-09 LAB
DEPRECATED RDW RBC AUTO: 39 FL (ref 37–54)
ERYTHROCYTE [DISTWIDTH] IN BLOOD BY AUTOMATED COUNT: 11.4 % (ref 12.3–15.4)
HCT VFR BLD AUTO: 42.4 % (ref 34–46.6)
HGB BLD-MCNC: 13.8 G/DL (ref 12–15.9)
MCH RBC QN AUTO: 30.8 PG (ref 26.6–33)
MCHC RBC AUTO-ENTMCNC: 32.5 G/DL (ref 31.5–35.7)
MCV RBC AUTO: 94.6 FL (ref 79–97)
PLATELET # BLD AUTO: 289 10*3/MM3 (ref 140–450)
PMV BLD AUTO: 10 FL (ref 6–12)
RBC # BLD AUTO: 4.48 10*6/MM3 (ref 3.77–5.28)
WBC # BLD AUTO: 9.13 10*3/MM3 (ref 3.4–10.8)

## 2020-12-09 PROCEDURE — 85027 COMPLETE CBC AUTOMATED: CPT | Performed by: NURSE PRACTITIONER

## 2020-12-09 PROCEDURE — 36415 COLL VENOUS BLD VENIPUNCTURE: CPT | Performed by: NURSE PRACTITIONER

## 2020-12-09 PROCEDURE — 80061 LIPID PANEL: CPT | Performed by: NURSE PRACTITIONER

## 2020-12-09 PROCEDURE — 80053 COMPREHEN METABOLIC PANEL: CPT | Performed by: NURSE PRACTITIONER

## 2020-12-09 PROCEDURE — 84443 ASSAY THYROID STIM HORMONE: CPT | Performed by: NURSE PRACTITIONER

## 2020-12-10 LAB
ALBUMIN SERPL-MCNC: 4.6 G/DL (ref 3.5–5.2)
ALBUMIN/GLOB SERPL: 1.5 G/DL
ALP SERPL-CCNC: 57 U/L (ref 39–117)
ALT SERPL W P-5'-P-CCNC: 17 U/L (ref 1–33)
ANION GAP SERPL CALCULATED.3IONS-SCNC: 13.7 MMOL/L (ref 5–15)
AST SERPL-CCNC: 25 U/L (ref 1–32)
BILIRUB SERPL-MCNC: 0.4 MG/DL (ref 0–1.2)
BUN SERPL-MCNC: 14 MG/DL (ref 8–23)
BUN/CREAT SERPL: 21.2 (ref 7–25)
CALCIUM SPEC-SCNC: 9.7 MG/DL (ref 8.6–10.5)
CHLORIDE SERPL-SCNC: 100 MMOL/L (ref 98–107)
CHOLEST SERPL-MCNC: 244 MG/DL (ref 0–200)
CO2 SERPL-SCNC: 23.3 MMOL/L (ref 22–29)
CREAT SERPL-MCNC: 0.66 MG/DL (ref 0.57–1)
GFR SERPL CREATININE-BSD FRML MDRD: 90 ML/MIN/1.73
GLOBULIN UR ELPH-MCNC: 3 GM/DL
GLUCOSE SERPL-MCNC: 80 MG/DL (ref 65–99)
HDLC SERPL-MCNC: 98 MG/DL (ref 40–60)
LDLC SERPL CALC-MCNC: 138 MG/DL (ref 0–100)
LDLC/HDLC SERPL: 1.39 {RATIO}
POTASSIUM SERPL-SCNC: 4.1 MMOL/L (ref 3.5–5.2)
PROT SERPL-MCNC: 7.6 G/DL (ref 6–8.5)
SODIUM SERPL-SCNC: 137 MMOL/L (ref 136–145)
TRIGL SERPL-MCNC: 51 MG/DL (ref 0–150)
TSH SERPL DL<=0.05 MIU/L-ACNC: 1.46 UIU/ML (ref 0.27–4.2)
VLDLC SERPL-MCNC: 8 MG/DL (ref 5–40)

## 2021-02-15 DIAGNOSIS — M19.90 ARTHRITIS: ICD-10-CM

## 2021-02-15 RX ORDER — TRAMADOL HYDROCHLORIDE 50 MG/1
TABLET ORAL
Qty: 60 TABLET | Refills: 0 | Status: SHIPPED | OUTPATIENT
Start: 2021-02-15 | End: 2021-03-17 | Stop reason: SDUPTHER

## 2021-03-17 ENCOUNTER — OFFICE VISIT (OUTPATIENT)
Dept: FAMILY MEDICINE CLINIC | Facility: CLINIC | Age: 66
End: 2021-03-17

## 2021-03-17 VITALS
WEIGHT: 102 LBS | HEIGHT: 63 IN | TEMPERATURE: 98.2 F | BODY MASS INDEX: 18.07 KG/M2 | OXYGEN SATURATION: 98 % | SYSTOLIC BLOOD PRESSURE: 119 MMHG | HEART RATE: 88 BPM | DIASTOLIC BLOOD PRESSURE: 72 MMHG

## 2021-03-17 DIAGNOSIS — Z85.3 HISTORY OF BREAST CANCER: ICD-10-CM

## 2021-03-17 DIAGNOSIS — M81.0 AGE-RELATED OSTEOPOROSIS WITHOUT CURRENT PATHOLOGICAL FRACTURE: ICD-10-CM

## 2021-03-17 DIAGNOSIS — F32.A DEPRESSION, UNSPECIFIED DEPRESSION TYPE: ICD-10-CM

## 2021-03-17 DIAGNOSIS — M19.90 ARTHRITIS: ICD-10-CM

## 2021-03-17 DIAGNOSIS — E78.5 HYPERLIPIDEMIA, UNSPECIFIED HYPERLIPIDEMIA TYPE: Primary | ICD-10-CM

## 2021-03-17 DIAGNOSIS — Z00.00 PREVENTATIVE HEALTH CARE: ICD-10-CM

## 2021-03-17 PROCEDURE — 86803 HEPATITIS C AB TEST: CPT | Performed by: NURSE PRACTITIONER

## 2021-03-17 PROCEDURE — 84443 ASSAY THYROID STIM HORMONE: CPT | Performed by: NURSE PRACTITIONER

## 2021-03-17 PROCEDURE — 85027 COMPLETE CBC AUTOMATED: CPT | Performed by: NURSE PRACTITIONER

## 2021-03-17 PROCEDURE — 99396 PREV VISIT EST AGE 40-64: CPT | Performed by: NURSE PRACTITIONER

## 2021-03-17 PROCEDURE — 80053 COMPREHEN METABOLIC PANEL: CPT | Performed by: NURSE PRACTITIONER

## 2021-03-17 PROCEDURE — 36415 COLL VENOUS BLD VENIPUNCTURE: CPT | Performed by: NURSE PRACTITIONER

## 2021-03-17 PROCEDURE — 80061 LIPID PANEL: CPT | Performed by: NURSE PRACTITIONER

## 2021-03-17 RX ORDER — TRAMADOL HYDROCHLORIDE 50 MG/1
50 TABLET ORAL EVERY 8 HOURS PRN
Qty: 60 TABLET | Refills: 2 | Status: SHIPPED | OUTPATIENT
Start: 2021-03-17 | End: 2021-06-25

## 2021-03-17 RX ORDER — CELECOXIB 200 MG/1
200 CAPSULE ORAL DAILY
Qty: 30 CAPSULE | Refills: 11 | Status: SHIPPED | OUTPATIENT
Start: 2021-03-17 | End: 2022-04-27 | Stop reason: SDUPTHER

## 2021-03-17 RX ORDER — ALENDRONATE SODIUM 70 MG/1
70 TABLET ORAL
Qty: 4 TABLET | Refills: 11 | Status: SHIPPED | OUTPATIENT
Start: 2021-03-17 | End: 2022-04-27 | Stop reason: SDUPTHER

## 2021-03-17 NOTE — PROGRESS NOTES
Chief Complaint  Annual Exam    Subjective          Charisma Maya presents to [facility] for   History of Present Illness     Patient presents for yearly exam    Hx of Breast cancer, underwent B mastectomy, recurrence in chest wall, udnerwent radiation/chemo with bowel disturbance, required illeostomy, then J pouch. Follows with oncology, Dr. Palomares yearly, gastro every 2 years, sigmoidoscopy has been stretched out to 7 to 8 years now.     Arthritis, stable on Celebrex and as needed tramadol, pain reported in ankles, hips, knees. Saw rhuem in past     OP, on fosamax and calcium, last dexa 6/2/2020, unchanged from previous     Hyperlipidemia, pt refuses statin, has made diet changes and exercising some, she ate at 10am. She denies GI upset, fatigue, chest pain/pressure, exercise intolerance, dyspnea, palpitations, syncope and pedal edema.         The following portions of the patient's history were reviewed and updated as appropriate: allergies, current medications, past family history, past medical history, past social history, past surgical history and problem list.    Past Medical History:   Diagnosis Date   • Breast cancer, BRCA2 positive (CMS/HCC)    • Chronic lung disease    • Disorder of vitamin B12    • Emphysema lung (CMS/HCC)    • History of kidney stones    • Migraine headache    • Osteonecrosis (CMS/HCC) 2015   • Osteoporosis 2002   • Osteosclerosis    • Seasonal allergies    • Ulcerative colitis (CMS/HCC)      Past Surgical History:   Procedure Laterality Date   • CARPAL TUNNEL RELEASE Bilateral 2000   • CHEST WALL MASS EXCISION Right 04/1993    2 knots removed from chest wall   • COLECTOMY PARTIAL / TOTAL  02/1992    and ileostomy by Dr. Goldstein because of rupture of bowel Feb. 1992   • CREATION / REVISION OF ILEOSTOMY / JEJUNOSTOMY      reversal of ileostomy and J-pouch placement-Dr. Sesay   • IMPLANTATION / REPLACEMENT INFUSION PUMP  2002    placement and removal  "in 2002   • MASTECTOMY Bilateral    • OOPHORECTOMY  1996    Uterus intact   • TRIGGER FINGER RELEASE Right 2002    right thumb trigger release   • TUBAL ABDOMINAL LIGATION  1991     Family History   Problem Relation Age of Onset   • Breast cancer Mother         passed away from metastasis     Social History     Tobacco Use   • Smoking status: Never Smoker   • Smokeless tobacco: Never Used   Substance Use Topics   • Alcohol use: No       Current Outpatient Medications:   •  acyclovir (ZOVIRAX) 800 MG tablet, TAKE 1 TABLET BY MOUTH EVERY 8 HOURS FOR 10 DAYS FOR HSV OUTBREAKS., Disp: 30 tablet, Rfl: 0  •  alendronate (Fosamax) 70 MG tablet, Take 1 tablet by mouth Every 7 (Seven) Days., Disp: 4 tablet, Rfl: 11  •  calcium carbonate (OS-TOSIN) 1250 (500 Ca) MG tablet, Take 1 tablet by mouth Daily., Disp: , Rfl:   •  celecoxib (CeleBREX) 200 MG capsule, Take 1 capsule by mouth Daily., Disp: 30 capsule, Rfl: 11  •  Cholecalciferol 1000 units tablet, Take 1,000 Units by mouth Daily., Disp: , Rfl:   •  diphenhydrAMINE HCl (BENADRYL ALLERGY PO), Take  by mouth Daily As Needed., Disp: , Rfl:   •  sertraline (ZOLOFT) 100 MG tablet, Take 100 mg by mouth Daily. #45, Disp: , Rfl: 0  •  traMADol (ULTRAM) 50 MG tablet, Take 1 tablet by mouth Every 8 (Eight) Hours As Needed for Severe Pain ., Disp: 60 tablet, Rfl: 2    Objective   Vital Signs:   /72 (BP Location: Left arm, Patient Position: Sitting, Cuff Size: Adult)   Pulse 88   Temp 98.2 °F (36.8 °C) (Skin)   Ht 160 cm (63\")   Wt 46.3 kg (102 lb)   SpO2 98%   BMI 18.07 kg/m²         Physical Exam  Vitals and nursing note reviewed.   Constitutional:       General: She is not in acute distress.     Appearance: She is well-developed. She is not diaphoretic.   Eyes:      Pupils: Pupils are equal, round, and reactive to light.   Neck:      Thyroid: No thyromegaly.      Vascular: No JVD.   Cardiovascular:      Rate and Rhythm: Normal rate and regular rhythm.      Heart sounds: " Normal heart sounds. No murmur heard.     Pulmonary:      Effort: Pulmonary effort is normal. No respiratory distress.      Breath sounds: Normal breath sounds.   Abdominal:      General: Bowel sounds are normal. There is no distension.      Palpations: Abdomen is soft.      Tenderness: There is no abdominal tenderness.   Musculoskeletal:         General: No tenderness. Normal range of motion.      Cervical back: Normal range of motion and neck supple.   Skin:     General: Skin is warm and dry.   Neurological:      Mental Status: She is alert and oriented to person, place, and time.      Sensory: No sensory deficit.   Psychiatric:         Behavior: Behavior normal.         Thought Content: Thought content normal.         Judgment: Judgment normal.          Result Review :     No visits with results within 7 Day(s) from this visit.   Latest known visit with results is:   Lab on 12/09/2020   Component Date Value Ref Range Status   • WBC 12/09/2020 9.13  3.40 - 10.80 10*3/mm3 Final   • RBC 12/09/2020 4.48  3.77 - 5.28 10*6/mm3 Final   • Hemoglobin 12/09/2020 13.8  12.0 - 15.9 g/dL Final   • Hematocrit 12/09/2020 42.4  34.0 - 46.6 % Final   • MCV 12/09/2020 94.6  79.0 - 97.0 fL Final   • MCH 12/09/2020 30.8  26.6 - 33.0 pg Final   • MCHC 12/09/2020 32.5  31.5 - 35.7 g/dL Final   • RDW 12/09/2020 11.4* 12.3 - 15.4 % Final   • RDW-SD 12/09/2020 39.0  37.0 - 54.0 fl Final   • MPV 12/09/2020 10.0  6.0 - 12.0 fL Final   • Platelets 12/09/2020 289  140 - 450 10*3/mm3 Final   • Glucose 12/09/2020 80  65 - 99 mg/dL Final   • BUN 12/09/2020 14  8 - 23 mg/dL Final   • Creatinine 12/09/2020 0.66  0.57 - 1.00 mg/dL Final   • Sodium 12/09/2020 137  136 - 145 mmol/L Final   • Potassium 12/09/2020 4.1  3.5 - 5.2 mmol/L Final   • Chloride 12/09/2020 100  98 - 107 mmol/L Final   • CO2 12/09/2020 23.3  22.0 - 29.0 mmol/L Final   • Calcium 12/09/2020 9.7  8.6 - 10.5 mg/dL Final   • Total Protein 12/09/2020 7.6  6.0 - 8.5 g/dL Final   •  Albumin 12/09/2020 4.60  3.50 - 5.20 g/dL Final   • ALT (SGPT) 12/09/2020 17  1 - 33 U/L Final   • AST (SGOT) 12/09/2020 25  1 - 32 U/L Final   • Alkaline Phosphatase 12/09/2020 57  39 - 117 U/L Final   • Total Bilirubin 12/09/2020 0.4  0.0 - 1.2 mg/dL Final   • eGFR Non African Amer 12/09/2020 90  >60 mL/min/1.73 Final   • Globulin 12/09/2020 3.0  gm/dL Final   • A/G Ratio 12/09/2020 1.5  g/dL Final   • BUN/Creatinine Ratio 12/09/2020 21.2  7.0 - 25.0 Final   • Anion Gap 12/09/2020 13.7  5.0 - 15.0 mmol/L Final   • TSH 12/09/2020 1.460  0.270 - 4.200 uIU/mL Final   • Total Cholesterol 12/09/2020 244* 0 - 200 mg/dL Final   • Triglycerides 12/09/2020 51  0 - 150 mg/dL Final   • HDL Cholesterol 12/09/2020 98* 40 - 60 mg/dL Final   • LDL Cholesterol  12/09/2020 138* 0 - 100 mg/dL Final   • VLDL Cholesterol 12/09/2020 8  5 - 40 mg/dL Final   • LDL/HDL Ratio 12/09/2020 1.39   Final                       Assessment and Plan    Diagnoses and all orders for this visit:    1. Hyperlipidemia, unspecified hyperlipidemia type (Primary)  Comments:  Reviewed previous lipids with patient, she refuses statin.  However her HDL is high enough that the ratio is low, cont HHD, will notify patient results of labs  Orders:  -     Lipid Panel  -     Comprehensive Metabolic Panel  -     CBC (No Diff)  -     TSH    2. Arthritis  Comments:  Stable on Celebrex and tramadol, refill both.  Discussed with patient to limit use of tramadol  Orders:  -     traMADol (ULTRAM) 50 MG tablet; Take 1 tablet by mouth Every 8 (Eight) Hours As Needed for Severe Pain .  Dispense: 60 tablet; Refill: 2  -     celecoxib (CeleBREX) 200 MG capsule; Take 1 capsule by mouth Daily.  Dispense: 30 capsule; Refill: 11    3. Age-related osteoporosis without current pathological fracture  Comments:  Last DEXA 6/2020, continue Fosamax and calcium/D,   Orders:  -     alendronate (Fosamax) 70 MG tablet; Take 1 tablet by mouth Every 7 (Seven) Days.  Dispense: 4 tablet;  Refill: 11    4. History of breast cancer  Comments:  Follow with oncology as directed    5. Depression, unspecified depression type  Comments:  Stable, follows with psychiatry    6. Preventative health care  Comments:  Has received first Covid vaccine  Recommend shingles vaccines  pt has sigmoidoscopy every 7-8 d/t J pouch    Orders:  -     Hepatitis C Antibody        I spent 25 minutes caring for Charisma on this date of service. This time includes time spent by me in the following activities:preparing for the visit, reviewing tests, performing a medically appropriate examination and/or evaluation , counseling and educating the patient/family/caregiver, ordering medications, tests, or procedures and documenting information in the medical record    Follow Up   Return in about 6 months (around 9/17/2021) for HTN panel 1 week prior to appt.  Patient was given instructions and counseling regarding her condition or for health maintenance advice. Please see specific information pulled into the AVS if appropriate.

## 2021-03-18 LAB
ALBUMIN SERPL-MCNC: 4.8 G/DL (ref 3.5–5.2)
ALBUMIN/GLOB SERPL: 1.7 G/DL
ALP SERPL-CCNC: 61 U/L (ref 39–117)
ALT SERPL W P-5'-P-CCNC: 20 U/L (ref 1–33)
ANION GAP SERPL CALCULATED.3IONS-SCNC: 10.2 MMOL/L (ref 5–15)
AST SERPL-CCNC: 30 U/L (ref 1–32)
BILIRUB SERPL-MCNC: 0.3 MG/DL (ref 0–1.2)
BUN SERPL-MCNC: 13 MG/DL (ref 8–23)
BUN/CREAT SERPL: 22.8 (ref 7–25)
CALCIUM SPEC-SCNC: 9.7 MG/DL (ref 8.6–10.5)
CHLORIDE SERPL-SCNC: 103 MMOL/L (ref 98–107)
CHOLEST SERPL-MCNC: 229 MG/DL (ref 0–200)
CO2 SERPL-SCNC: 25.8 MMOL/L (ref 22–29)
CREAT SERPL-MCNC: 0.57 MG/DL (ref 0.57–1)
DEPRECATED RDW RBC AUTO: 40.8 FL (ref 37–54)
ERYTHROCYTE [DISTWIDTH] IN BLOOD BY AUTOMATED COUNT: 11.6 % (ref 12.3–15.4)
GFR SERPL CREATININE-BSD FRML MDRD: 107 ML/MIN/1.73
GLOBULIN UR ELPH-MCNC: 2.9 GM/DL
GLUCOSE SERPL-MCNC: 89 MG/DL (ref 65–99)
HCT VFR BLD AUTO: 43 % (ref 34–46.6)
HCV AB SER DONR QL: NORMAL
HDLC SERPL-MCNC: 98 MG/DL (ref 40–60)
HGB BLD-MCNC: 13.9 G/DL (ref 12–15.9)
LDLC SERPL CALC-MCNC: 123 MG/DL (ref 0–100)
LDLC/HDLC SERPL: 1.24 {RATIO}
MCH RBC QN AUTO: 31 PG (ref 26.6–33)
MCHC RBC AUTO-ENTMCNC: 32.3 G/DL (ref 31.5–35.7)
MCV RBC AUTO: 95.8 FL (ref 79–97)
PLATELET # BLD AUTO: 306 10*3/MM3 (ref 140–450)
PMV BLD AUTO: 9.8 FL (ref 6–12)
POTASSIUM SERPL-SCNC: 4.6 MMOL/L (ref 3.5–5.2)
PROT SERPL-MCNC: 7.7 G/DL (ref 6–8.5)
RBC # BLD AUTO: 4.49 10*6/MM3 (ref 3.77–5.28)
SODIUM SERPL-SCNC: 139 MMOL/L (ref 136–145)
TRIGL SERPL-MCNC: 47 MG/DL (ref 0–150)
TSH SERPL DL<=0.05 MIU/L-ACNC: 1.11 UIU/ML (ref 0.27–4.2)
VLDLC SERPL-MCNC: 8 MG/DL (ref 5–40)
WBC # BLD AUTO: 7.46 10*3/MM3 (ref 3.4–10.8)

## 2021-06-24 DIAGNOSIS — M19.90 ARTHRITIS: ICD-10-CM

## 2021-06-25 RX ORDER — TRAMADOL HYDROCHLORIDE 50 MG/1
TABLET ORAL
Qty: 60 TABLET | Refills: 2 | Status: SHIPPED | OUTPATIENT
Start: 2021-06-25 | End: 2021-09-22

## 2021-09-21 DIAGNOSIS — M19.90 ARTHRITIS: ICD-10-CM

## 2021-09-22 RX ORDER — TRAMADOL HYDROCHLORIDE 50 MG/1
TABLET ORAL
Qty: 60 TABLET | Refills: 2 | Status: SHIPPED | OUTPATIENT
Start: 2021-09-22 | End: 2021-12-09

## 2021-09-30 ENCOUNTER — OFFICE VISIT (OUTPATIENT)
Dept: FAMILY MEDICINE CLINIC | Facility: CLINIC | Age: 66
End: 2021-09-30

## 2021-09-30 VITALS
SYSTOLIC BLOOD PRESSURE: 121 MMHG | DIASTOLIC BLOOD PRESSURE: 75 MMHG | TEMPERATURE: 98.7 F | BODY MASS INDEX: 18.39 KG/M2 | HEART RATE: 94 BPM | WEIGHT: 103.8 LBS | OXYGEN SATURATION: 98 % | HEIGHT: 63 IN

## 2021-09-30 DIAGNOSIS — E78.2 MIXED HYPERLIPIDEMIA: Primary | ICD-10-CM

## 2021-09-30 DIAGNOSIS — M81.0 AGE-RELATED OSTEOPOROSIS WITHOUT CURRENT PATHOLOGICAL FRACTURE: ICD-10-CM

## 2021-09-30 DIAGNOSIS — M19.90 ARTHRITIS: ICD-10-CM

## 2021-09-30 DIAGNOSIS — Z00.00 PREVENTATIVE HEALTH CARE: ICD-10-CM

## 2021-09-30 DIAGNOSIS — Z85.3 HISTORY OF BREAST CANCER: ICD-10-CM

## 2021-09-30 PROBLEM — F41.8 ANXIETY WITH DEPRESSION: Status: ACTIVE | Noted: 2018-06-07

## 2021-09-30 PROBLEM — M79.7 FIBROMYALGIA: Status: ACTIVE | Noted: 2019-06-04

## 2021-09-30 PROCEDURE — 80048 BASIC METABOLIC PNL TOTAL CA: CPT | Performed by: NURSE PRACTITIONER

## 2021-09-30 PROCEDURE — 80061 LIPID PANEL: CPT | Performed by: NURSE PRACTITIONER

## 2021-09-30 PROCEDURE — 99214 OFFICE O/P EST MOD 30 MIN: CPT | Performed by: NURSE PRACTITIONER

## 2021-09-30 RX ORDER — ALPRAZOLAM 0.25 MG/1
0.25 TABLET ORAL 3 TIMES DAILY PRN
COMMUNITY
Start: 2021-09-09

## 2021-09-30 NOTE — PROGRESS NOTES
Chief Complaint  Hypertension and Follow-up (6 mo)    Subjective          Charisma Maya presents to [facility] for   History of Present Illness     Hx of Breast cancer, B mastectomy, recurrence in chest wall, underwent radiation/chemo with bowel disturbance, required illeostomy, then J pouch. Follows with oncology, Dr. Palomares yearly, gastro every 2 years, sigmoidoscopy now Q 7 to 8 years     Arthritis, stable on Celebrex and as needed tramadol, pain reported in ankles, hips, knees. Saw rhuem in past but no longer. Tramadol recently refilled     OP, on fosamax and calcium, last dexa 6/2/2020, unchanged from previous     Hyperlipidemia, denies GI upset, fatigue, chest pain/pressure, exercise intolerance, dyspnea, palpitations, syncope and pedal edema.       The following portions of the patient's history were reviewed and updated as appropriate: allergies, current medications, past family history, past medical history, past social history, past surgical history and problem list.    Past Medical History:   Diagnosis Date   • Breast cancer, BRCA2 positive (CMS/HCC)    • Chronic lung disease    • Disorder of vitamin B12    • Emphysema lung (CMS/HCC)    • History of kidney stones    • Migraine headache    • Osteonecrosis (CMS/HCC) 2015   • Osteoporosis 2002   • Osteosclerosis    • Seasonal allergies    • Ulcerative colitis (CMS/HCC)      Past Surgical History:   Procedure Laterality Date   • CARPAL TUNNEL RELEASE Bilateral 2000   • CHEST WALL MASS EXCISION Right 04/1993    2 knots removed from chest wall   • COLECTOMY PARTIAL / TOTAL  02/1992    and ileostomy by Dr. Goldstein because of rupture of bowel Feb. 1992   • CREATION / REVISION OF ILEOSTOMY / JEJUNOSTOMY      reversal of ileostomy and J-pouch placement-Dr. Sesay   • IMPLANTATION / REPLACEMENT INFUSION PUMP  2002    placement and removal in 2002   • MASTECTOMY Bilateral    • OOPHORECTOMY  1996    Uterus intact   • TRIGGER FINGER  "RELEASE Right 2002    right thumb trigger release   • TUBAL ABDOMINAL LIGATION  1991     Family History   Problem Relation Age of Onset   • Breast cancer Mother         passed away from metastasis     Social History     Tobacco Use   • Smoking status: Never Smoker   • Smokeless tobacco: Never Used   Substance Use Topics   • Alcohol use: No       Current Outpatient Medications:   •  acyclovir (ZOVIRAX) 800 MG tablet, TAKE 1 TABLET BY MOUTH EVERY 8 HOURS FOR 10 DAYS FOR HSV OUTBREAKS., Disp: 30 tablet, Rfl: 0  •  alendronate (Fosamax) 70 MG tablet, Take 1 tablet by mouth Every 7 (Seven) Days., Disp: 4 tablet, Rfl: 11  •  ALPRAZolam (XANAX) 0.25 MG tablet, Take 0.25 mg by mouth 3 (Three) Times a Day As Needed., Disp: , Rfl:   •  calcium carbonate (OS-TOSIN) 1250 (500 Ca) MG tablet, Take 1 tablet by mouth Daily., Disp: , Rfl:   •  celecoxib (CeleBREX) 200 MG capsule, Take 1 capsule by mouth Daily., Disp: 30 capsule, Rfl: 11  •  Cholecalciferol 1000 units tablet, Take 1,000 Units by mouth Daily., Disp: , Rfl:   •  diphenhydrAMINE HCl (BENADRYL ALLERGY PO), Take  by mouth Daily As Needed., Disp: , Rfl:   •  sertraline (ZOLOFT) 100 MG tablet, Take 200 mg by mouth Daily. #45, Disp: , Rfl: 0  •  traMADol (ULTRAM) 50 MG tablet, TAKE 1 TABLET BY MOUTH EVERY 8 HOURS AS NEEDED FOR SEVERE PAIN, Disp: 60 tablet, Rfl: 2    Objective   Vital Signs:   /75 (BP Location: Left arm, Patient Position: Sitting, Cuff Size: Small Adult)   Pulse 94   Temp 98.7 °F (37.1 °C) (Infrared)   Ht 160 cm (62.99\")   Wt 47.1 kg (103 lb 12.8 oz)   SpO2 98%   BMI 18.39 kg/m²       Physical Exam  Vitals and nursing note reviewed.   Constitutional:       General: She is not in acute distress.     Appearance: She is well-developed. She is not diaphoretic.   Eyes:      Pupils: Pupils are equal, round, and reactive to light.   Neck:      Thyroid: No thyromegaly.      Vascular: No JVD.   Cardiovascular:      Rate and Rhythm: Normal rate and regular " rhythm.      Heart sounds: Normal heart sounds. No murmur heard.     Pulmonary:      Effort: Pulmonary effort is normal. No respiratory distress.      Breath sounds: Normal breath sounds.   Abdominal:      General: Bowel sounds are normal. There is no distension.      Palpations: Abdomen is soft.      Tenderness: There is no abdominal tenderness.   Musculoskeletal:         General: No tenderness. Normal range of motion.      Cervical back: Normal range of motion and neck supple.   Skin:     General: Skin is warm and dry.   Neurological:      Mental Status: She is alert and oriented to person, place, and time.      Sensory: No sensory deficit.   Psychiatric:         Behavior: Behavior normal.         Thought Content: Thought content normal.         Judgment: Judgment normal.          Result Review :     No visits with results within 7 Day(s) from this visit.   Latest known visit with results is:   Office Visit on 03/17/2021   Component Date Value Ref Range Status   • Total Cholesterol 03/17/2021 229* 0 - 200 mg/dL Final   • Triglycerides 03/17/2021 47  0 - 150 mg/dL Final   • HDL Cholesterol 03/17/2021 98* 40 - 60 mg/dL Final   • LDL Cholesterol  03/17/2021 123* 0 - 100 mg/dL Final   • VLDL Cholesterol 03/17/2021 8  5 - 40 mg/dL Final   • LDL/HDL Ratio 03/17/2021 1.24   Final   • Glucose 03/17/2021 89  65 - 99 mg/dL Final   • BUN 03/17/2021 13  8 - 23 mg/dL Final   • Creatinine 03/17/2021 0.57  0.57 - 1.00 mg/dL Final   • Sodium 03/17/2021 139  136 - 145 mmol/L Final   • Potassium 03/17/2021 4.6  3.5 - 5.2 mmol/L Final   • Chloride 03/17/2021 103  98 - 107 mmol/L Final   • CO2 03/17/2021 25.8  22.0 - 29.0 mmol/L Final   • Calcium 03/17/2021 9.7  8.6 - 10.5 mg/dL Final   • Total Protein 03/17/2021 7.7  6.0 - 8.5 g/dL Final   • Albumin 03/17/2021 4.80  3.50 - 5.20 g/dL Final   • ALT (SGPT) 03/17/2021 20  1 - 33 U/L Final   • AST (SGOT) 03/17/2021 30  1 - 32 U/L Final   • Alkaline Phosphatase 03/17/2021 61  39 - 117 U/L  Final   • Total Bilirubin 03/17/2021 0.3  0.0 - 1.2 mg/dL Final   • eGFR Non African Amer 03/17/2021 107  >60 mL/min/1.73 Final   • Globulin 03/17/2021 2.9  gm/dL Final   • A/G Ratio 03/17/2021 1.7  g/dL Final   • BUN/Creatinine Ratio 03/17/2021 22.8  7.0 - 25.0 Final   • Anion Gap 03/17/2021 10.2  5.0 - 15.0 mmol/L Final   • WBC 03/17/2021 7.46  3.40 - 10.80 10*3/mm3 Final   • RBC 03/17/2021 4.49  3.77 - 5.28 10*6/mm3 Final   • Hemoglobin 03/17/2021 13.9  12.0 - 15.9 g/dL Final   • Hematocrit 03/17/2021 43.0  34.0 - 46.6 % Final   • MCV 03/17/2021 95.8  79.0 - 97.0 fL Final   • MCH 03/17/2021 31.0  26.6 - 33.0 pg Final   • MCHC 03/17/2021 32.3  31.5 - 35.7 g/dL Final   • RDW 03/17/2021 11.6* 12.3 - 15.4 % Final   • RDW-SD 03/17/2021 40.8  37.0 - 54.0 fl Final   • MPV 03/17/2021 9.8  6.0 - 12.0 fL Final   • Platelets 03/17/2021 306  140 - 450 10*3/mm3 Final   • TSH 03/17/2021 1.110  0.270 - 4.200 uIU/mL Final   • Hepatitis C Ab 03/17/2021 Non-Reactive  Non-Reactive Final                       Assessment and Plan    Diagnoses and all orders for this visit:    1. Mixed hyperlipidemia (Primary)  Comments:  Previous labs reviewed, low cardiac risk. Lipid panel today, patient is fasting.   Orders:  -     Lipid Panel    2. History of breast cancer  Comments:  Continue following with Dr. Palomares as directed    3. Preventative health care  Comments:  rec pap Q3 years per GYN prev pt of Dr. Bhatt-now retired, pt will sched  labs today as ordered, notify results  rec 2nd shingrix  sigmoidoscopy Q5 yrs    Orders:  -     Basic Metabolic Panel    4. Age-related osteoporosis without current pathological fracture  Comments:  Next DEXA due 2022, continue alendronate and rf.    5. Arthritis  Comments:  Pain stable on Celebrex and as needed tramadol. Celebrex not needed at this time, tramadol refilled on 9/22/2021        I spent 30 minutes caring for Charisma Maya on this date of service. This time includes time spent by me in the  following activities: preparing for the visit, reviewing tests, performing a medically appropriate examination and/or evaluation , counseling and educating the patient/family/caregiver, ordering medications, tests, or procedures and documenting information in the medical record        Follow Up     Return in about 6 months (around 3/30/2022) for OP/OA, HLD.  Patient was given instructions and counseling regarding her condition or for health maintenance advice. Please see specific information pulled into the AVS if appropriate.      EMR Dragon transcription disclaimer:  Some of this encounter note is an electronic transcription translation of spoken language to printed text. The electronic translation of spoken language may permit erroneous, or at times, nonsensical words or phrases to be inadvertently transcribed; Although I have reviewed the note for such errors some may still exist.

## 2021-09-30 NOTE — PROGRESS NOTES
Venipuncture Blood Specimen Collection  Venipuncture performed in left arm by Kamilla Manriquez MA with good hemostasis. Patient tolerated the procedure well without complications.   09/30/21   Kamilla Manriquez MA

## 2021-10-01 LAB
ANION GAP SERPL CALCULATED.3IONS-SCNC: 11.3 MMOL/L (ref 5–15)
BUN SERPL-MCNC: 12 MG/DL (ref 8–23)
BUN/CREAT SERPL: 16.7 (ref 7–25)
CALCIUM SPEC-SCNC: 9.6 MG/DL (ref 8.6–10.5)
CHLORIDE SERPL-SCNC: 103 MMOL/L (ref 98–107)
CHOLEST SERPL-MCNC: 256 MG/DL (ref 0–200)
CO2 SERPL-SCNC: 23.7 MMOL/L (ref 22–29)
CREAT SERPL-MCNC: 0.72 MG/DL (ref 0.57–1)
GFR SERPL CREATININE-BSD FRML MDRD: 82 ML/MIN/1.73
GLUCOSE SERPL-MCNC: 80 MG/DL (ref 65–99)
HDLC SERPL-MCNC: 88 MG/DL (ref 40–60)
LDLC SERPL CALC-MCNC: 161 MG/DL (ref 0–100)
LDLC/HDLC SERPL: 1.8 {RATIO}
POTASSIUM SERPL-SCNC: 3.8 MMOL/L (ref 3.5–5.2)
SODIUM SERPL-SCNC: 138 MMOL/L (ref 136–145)
TRIGL SERPL-MCNC: 47 MG/DL (ref 0–150)
VLDLC SERPL-MCNC: 7 MG/DL (ref 5–40)

## 2021-10-05 RX ORDER — ACYCLOVIR 800 MG/1
TABLET ORAL
Qty: 30 TABLET | Refills: 0 | Status: SHIPPED | OUTPATIENT
Start: 2021-10-05 | End: 2022-09-01

## 2021-10-05 NOTE — TELEPHONE ENCOUNTER
Caller: Charisma Maya    Relationship: Self      Medication requested (name and dosage): acyclovir (ZOVIRAX) 800 MG tablet  Pharmacy where request should be sent: [id number] [place] [id number] [facility] [address] [place] [address] - [phone]  - [phone]   [phone]      Does the patient have less than a 3 day supply:  [] Yes  [] No    Ramón Barrett Rep   10/05/21 10:59 EDT

## 2021-12-09 DIAGNOSIS — M19.90 ARTHRITIS: ICD-10-CM

## 2021-12-09 RX ORDER — TRAMADOL HYDROCHLORIDE 50 MG/1
TABLET ORAL
Qty: 60 TABLET | Refills: 2 | Status: SHIPPED | OUTPATIENT
Start: 2021-12-09 | End: 2022-02-18

## 2021-12-09 NOTE — TELEPHONE ENCOUNTER
Rx Refill Note  Requested Prescriptions     Pending Prescriptions Disp Refills   • traMADol (ULTRAM) 50 MG tablet [Pharmacy Med Name: TRAMADOL 50MG TABLETS] 60 tablet      Sig: TAKE 1 TABLET BY MOUTH EVERY 8 HOURS AS NEEDED FOR SEVERE PAIN      Last office visit with prescribing clinician: 9/30/2021      Next office visit with prescribing clinician: 3/30/2022            Kamilla Manriquez MA  12/09/21, 12:09 EST

## 2022-02-10 ENCOUNTER — TELEPHONE (OUTPATIENT)
Dept: FAMILY MEDICINE CLINIC | Facility: CLINIC | Age: 67
End: 2022-02-10

## 2022-02-18 DIAGNOSIS — M19.90 ARTHRITIS: ICD-10-CM

## 2022-02-18 RX ORDER — TRAMADOL HYDROCHLORIDE 50 MG/1
TABLET ORAL
Qty: 60 TABLET | Refills: 0 | Status: SHIPPED | OUTPATIENT
Start: 2022-02-18 | End: 2022-03-31

## 2022-03-31 DIAGNOSIS — M19.90 ARTHRITIS: ICD-10-CM

## 2022-03-31 RX ORDER — TRAMADOL HYDROCHLORIDE 50 MG/1
TABLET ORAL
Qty: 60 TABLET | Refills: 0 | Status: SHIPPED | OUTPATIENT
Start: 2022-03-31 | End: 2022-04-27 | Stop reason: SDUPTHER

## 2022-03-31 NOTE — TELEPHONE ENCOUNTER
Rx Refill Note  Requested Prescriptions     Pending Prescriptions Disp Refills   • traMADol (ULTRAM) 50 MG tablet [Pharmacy Med Name: TRAMADOL 50MG TABLETS] 60 tablet 0     Sig: TAKE 1 TABLET BY MOUTH EVERY 8 HOURS AS NEEDED FOR SEVERE PAIN      Last office visit with prescribing clinician: 9/30/2021      Next office visit with prescribing clinician: Visit date not found            Kamilla Manriquez MA  03/31/22, 12:19 EDT

## 2022-04-27 ENCOUNTER — OFFICE VISIT (OUTPATIENT)
Dept: FAMILY MEDICINE CLINIC | Facility: CLINIC | Age: 67
End: 2022-04-27

## 2022-04-27 VITALS
SYSTOLIC BLOOD PRESSURE: 132 MMHG | HEIGHT: 63 IN | BODY MASS INDEX: 17.65 KG/M2 | WEIGHT: 99.6 LBS | OXYGEN SATURATION: 97 % | HEART RATE: 95 BPM | DIASTOLIC BLOOD PRESSURE: 84 MMHG

## 2022-04-27 DIAGNOSIS — E78.2 MIXED HYPERLIPIDEMIA: Primary | ICD-10-CM

## 2022-04-27 DIAGNOSIS — Z00.00 PREVENTATIVE HEALTH CARE: ICD-10-CM

## 2022-04-27 DIAGNOSIS — M81.0 AGE-RELATED OSTEOPOROSIS WITHOUT CURRENT PATHOLOGICAL FRACTURE: ICD-10-CM

## 2022-04-27 DIAGNOSIS — Z85.3 HISTORY OF BREAST CANCER: ICD-10-CM

## 2022-04-27 DIAGNOSIS — M19.90 ARTHRITIS: ICD-10-CM

## 2022-04-27 PROCEDURE — 90732 PPSV23 VACC 2 YRS+ SUBQ/IM: CPT | Performed by: NURSE PRACTITIONER

## 2022-04-27 PROCEDURE — 99214 OFFICE O/P EST MOD 30 MIN: CPT | Performed by: NURSE PRACTITIONER

## 2022-04-27 PROCEDURE — G0009 ADMIN PNEUMOCOCCAL VACCINE: HCPCS | Performed by: NURSE PRACTITIONER

## 2022-04-27 RX ORDER — TRAMADOL HYDROCHLORIDE 50 MG/1
50 TABLET ORAL EVERY 8 HOURS PRN
Qty: 60 TABLET | Refills: 2 | Status: SHIPPED | OUTPATIENT
Start: 2022-04-27 | End: 2022-07-14

## 2022-04-27 RX ORDER — ALENDRONATE SODIUM 70 MG/1
70 TABLET ORAL
Qty: 4 TABLET | Refills: 11 | Status: SHIPPED | OUTPATIENT
Start: 2022-04-27 | End: 2022-04-28

## 2022-04-27 RX ORDER — CELECOXIB 200 MG/1
200 CAPSULE ORAL DAILY
Qty: 30 CAPSULE | Refills: 11 | Status: SHIPPED | OUTPATIENT
Start: 2022-04-27

## 2022-04-27 NOTE — PROGRESS NOTES
Chief Complaint  Chief Complaint   Patient presents with   • Hyperlipidemia   • Osteoporosis   • Follow-up     From 9/30/21; please make note to schedule mwv with next f/u           Subjective          Charisma Maya presents to [facility] for   History of Present Illness       Hx of Breast cancer, B mastectomy, w recurrence in chest wall. underwent radiation/chemo with bowel disturbance, required illeostomy, then J pouch. Follows with oncology, Dr. Palomares yearly, gastro every 2 years, sigmoidoscopy now Q 7 to 8 years     Arthritis, stable on Celebrex and as needed tramadol, pain reported in ankles, hips, knees. Saw rhuem in past but no longer.      OP, on fosamax and calcium, last dexa 6/2/2020, unchanged from previous     Hyperlipidemia, denies GI upset, fatigue, chest pain/pressure, exercise intolerance, dyspnea, palpitations, syncope and pedal edema.         The following portions of the patient's history were reviewed and updated as appropriate: allergies, current medications, past family history, past medical history, past social history, past surgical history and problem list.    Past Medical History:   Diagnosis Date   • Breast cancer, BRCA2 positive (HCC)    • Chronic lung disease    • Disorder of vitamin B12    • Emphysema lung (HCC)    • History of kidney stones    • Migraine headache    • Osteonecrosis (HCC) 2015   • Osteoporosis 2002   • Osteosclerosis    • Seasonal allergies    • Ulcerative colitis (HCC)      Past Surgical History:   Procedure Laterality Date   • CARPAL TUNNEL RELEASE Bilateral 2000   • CHEST WALL MASS EXCISION Right 04/1993    2 knots removed from chest wall   • COLECTOMY PARTIAL / TOTAL  02/1992    and ileostomy by Dr. Goldstein because of rupture of bowel Feb. 1992   • CREATION / REVISION OF ILEOSTOMY / JEJUNOSTOMY      reversal of ileostomy and J-pouch placement-Dr. Sesay   • IMPLANTATION / REPLACEMENT INFUSION PUMP  2002    placement and removal  "in 2002   • MASTECTOMY Bilateral    • OOPHORECTOMY  1996    Uterus intact   • TRIGGER FINGER RELEASE Right 2002    right thumb trigger release   • TUBAL ABDOMINAL LIGATION  1991     Family History   Problem Relation Age of Onset   • Breast cancer Mother         passed away from metastasis     Social History     Tobacco Use   • Smoking status: Never Smoker   • Smokeless tobacco: Never Used   Substance Use Topics   • Alcohol use: No       Current Outpatient Medications:   •  acyclovir (ZOVIRAX) 800 MG tablet, TAKE 1 TABLET BY MOUTH EVERY 8 HOURS FOR 10 DAYS FOR HSV OUTBREAKS., Disp: 30 tablet, Rfl: 0  •  alendronate (Fosamax) 70 MG tablet, Take 1 tablet by mouth Every 7 (Seven) Days., Disp: 4 tablet, Rfl: 11  •  ALPRAZolam (XANAX) 0.25 MG tablet, Take 0.25 mg by mouth 3 (Three) Times a Day As Needed., Disp: , Rfl:   •  calcium carbonate (OS-TOSIN) 1250 (500 Ca) MG tablet, Take 1 tablet by mouth Daily., Disp: , Rfl:   •  celecoxib (CeleBREX) 200 MG capsule, Take 1 capsule by mouth Daily., Disp: 30 capsule, Rfl: 11  •  Cholecalciferol 1000 units tablet, Take 1,000 Units by mouth Daily., Disp: , Rfl:   •  diphenhydrAMINE HCl (BENADRYL ALLERGY PO), Take  by mouth Daily As Needed., Disp: , Rfl:   •  sertraline (ZOLOFT) 100 MG tablet, Take 200 mg by mouth Daily. #45, Disp: , Rfl: 0  •  traMADol (ULTRAM) 50 MG tablet, Take 1 tablet by mouth Every 8 (Eight) Hours As Needed for Severe Pain ., Disp: 60 tablet, Rfl: 2    Objective   Vital Signs:   /84 (BP Location: Left arm, Patient Position: Sitting, Cuff Size: Small Adult)   Pulse 95   Ht 160 cm (62.99\")   Wt 45.2 kg (99 lb 9.6 oz)   SpO2 97%   BMI 17.65 kg/m²           Physical Exam  Vitals and nursing note reviewed.   Constitutional:       General: She is not in acute distress.     Appearance: She is well-developed. She is not diaphoretic.   Eyes:      Pupils: Pupils are equal, round, and reactive to light.   Neck:      Thyroid: No thyromegaly.      Vascular: No " JVD.   Cardiovascular:      Rate and Rhythm: Normal rate and regular rhythm.      Heart sounds: Normal heart sounds. No murmur heard.  Pulmonary:      Effort: Pulmonary effort is normal. No respiratory distress.      Breath sounds: Normal breath sounds.   Abdominal:      General: Bowel sounds are normal. There is no distension.      Palpations: Abdomen is soft.      Tenderness: There is no abdominal tenderness.   Musculoskeletal:         General: No tenderness. Normal range of motion.      Cervical back: Normal range of motion and neck supple.   Skin:     General: Skin is warm and dry.   Neurological:      Mental Status: She is alert and oriented to person, place, and time.      Sensory: No sensory deficit.   Psychiatric:         Behavior: Behavior normal.         Thought Content: Thought content normal.         Judgment: Judgment normal.          Result Review :     No visits with results within 7 Day(s) from this visit.   Latest known visit with results is:   Office Visit on 09/30/2021   Component Date Value Ref Range Status   • Glucose 09/30/2021 80  65 - 99 mg/dL Final   • BUN 09/30/2021 12  8 - 23 mg/dL Final   • Creatinine 09/30/2021 0.72  0.57 - 1.00 mg/dL Final   • Sodium 09/30/2021 138  136 - 145 mmol/L Final   • Potassium 09/30/2021 3.8  3.5 - 5.2 mmol/L Final   • Chloride 09/30/2021 103  98 - 107 mmol/L Final   • CO2 09/30/2021 23.7  22.0 - 29.0 mmol/L Final   • Calcium 09/30/2021 9.6  8.6 - 10.5 mg/dL Final   • eGFR Non  Amer 09/30/2021 82  >60 mL/min/1.73 Final   • BUN/Creatinine Ratio 09/30/2021 16.7  7.0 - 25.0 Final   • Anion Gap 09/30/2021 11.3  5.0 - 15.0 mmol/L Final   • Total Cholesterol 09/30/2021 256 (A) 0 - 200 mg/dL Final   • Triglycerides 09/30/2021 47  0 - 150 mg/dL Final   • HDL Cholesterol 09/30/2021 88 (A) 40 - 60 mg/dL Final   • LDL Cholesterol  09/30/2021 161 (A) 0 - 100 mg/dL Final   • VLDL Cholesterol 09/30/2021 7  5 - 40 mg/dL Final   • LDL/HDL Ratio 09/30/2021 1.80   Final                              Assessment and Plan    Diagnoses and all orders for this visit:    1. Mixed hyperlipidemia (Primary)  Comments:  Previous lipids stable, will check labs with yearly exam in 6 months    2. Age-related osteoporosis without current pathological fracture  Comments:  Last DEXA 6/2020, due and ordered today, continue Fosamax and calcium/D,   Orders:  -     DEXA Bone Density Axial; Future  -     alendronate (Fosamax) 70 MG tablet; Take 1 tablet by mouth Every 7 (Seven) Days.  Dispense: 4 tablet; Refill: 11    3. Arthritis  Comments:  Stable on Celebrex and tramadol, refill both.  Discussed with patient to limit use of tramadol  Orders:  -     celecoxib (CeleBREX) 200 MG capsule; Take 1 capsule by mouth Daily.  Dispense: 30 capsule; Refill: 11  -     traMADol (ULTRAM) 50 MG tablet; Take 1 tablet by mouth Every 8 (Eight) Hours As Needed for Severe Pain .  Dispense: 60 tablet; Refill: 2    4. History of breast cancer    5. Preventative health care  Comments:  Pneumo 23 today    Other orders  -     Pneumococcal Polysaccharide Vaccine 23-Valent Greater Than or Equal To 1yo Subcutaneous / IM        I spent 30 minutes caring for Charisma Maya on this date of service. This time includes time spent by me in the following activities: preparing for the visit, reviewing tests, performing a medically appropriate examination and/or evaluation , counseling and educating the patient/family/caregiver, ordering medications, tests, or procedures and documenting information in the medical record        Follow Up     Return in about 6 months (around 10/27/2022), or if symptoms worsen or fail to improve, for Medicare Wellness. HTN panel prior to appt.  Patient was given instructions and counseling regarding her condition or for health maintenance advice. Please see specific information pulled into the AVS if appropriate.        Part of this note may be an electronic transcription/translation of spoken language to  printed text using the Dragon Dictation System

## 2022-04-28 RX ORDER — ALENDRONATE SODIUM 70 MG/1
TABLET ORAL
Qty: 12 TABLET | Refills: 1 | Status: SHIPPED | OUTPATIENT
Start: 2022-04-28 | End: 2022-10-27 | Stop reason: SDUPTHER

## 2022-06-09 ENCOUNTER — TELEPHONE (OUTPATIENT)
Dept: FAMILY MEDICINE CLINIC | Facility: CLINIC | Age: 67
End: 2022-06-09

## 2022-06-09 NOTE — TELEPHONE ENCOUNTER
I spoke to pt, she said she was calling about a DEXA scan and not a biopsy.  She said it's scheduled and I have faxed the order.

## 2022-06-09 NOTE — TELEPHONE ENCOUNTER
Caller: Charisma Maya    Relationship: Self    Best call back number: [phone]    What is the best time to reach you: ANYTIME    Who are you requesting to speak with (clinical staff, provider,  specific staff member): STEPHAN GUTIERREZ OR MA    What was the call regarding: PATIENT STATES SHE IS GOING TO HAVE THE BIOPSY DONE,JUST HAS NOT DONE IT YET.    Do you require a callback: YES

## 2022-07-14 DIAGNOSIS — M19.90 ARTHRITIS: ICD-10-CM

## 2022-07-14 RX ORDER — TRAMADOL HYDROCHLORIDE 50 MG/1
TABLET ORAL
Qty: 60 TABLET | Refills: 2 | Status: SHIPPED | OUTPATIENT
Start: 2022-07-14 | End: 2022-10-05

## 2022-09-01 RX ORDER — ACYCLOVIR 800 MG/1
TABLET ORAL
Qty: 30 TABLET | Refills: 0 | Status: SHIPPED | OUTPATIENT
Start: 2022-09-01 | End: 2022-10-27 | Stop reason: SDUPTHER

## 2022-09-02 RX ORDER — ACYCLOVIR 800 MG/1
TABLET ORAL
Qty: 30 TABLET | Refills: 0 | OUTPATIENT
Start: 2022-09-02

## 2022-10-04 DIAGNOSIS — M19.90 ARTHRITIS: ICD-10-CM

## 2022-10-05 RX ORDER — TRAMADOL HYDROCHLORIDE 50 MG/1
TABLET ORAL
Qty: 60 TABLET | Refills: 0 | Status: SHIPPED | OUTPATIENT
Start: 2022-10-05 | End: 2022-10-27 | Stop reason: SDUPTHER

## 2022-10-20 ENCOUNTER — CLINICAL SUPPORT (OUTPATIENT)
Dept: FAMILY MEDICINE CLINIC | Facility: CLINIC | Age: 67
End: 2022-10-20

## 2022-10-20 DIAGNOSIS — E78.2 MIXED HYPERLIPIDEMIA: Primary | ICD-10-CM

## 2022-10-20 PROCEDURE — 80053 COMPREHEN METABOLIC PANEL: CPT | Performed by: NURSE PRACTITIONER

## 2022-10-20 PROCEDURE — 36415 COLL VENOUS BLD VENIPUNCTURE: CPT | Performed by: NURSE PRACTITIONER

## 2022-10-20 PROCEDURE — 80061 LIPID PANEL: CPT | Performed by: NURSE PRACTITIONER

## 2022-10-20 PROCEDURE — 84443 ASSAY THYROID STIM HORMONE: CPT | Performed by: NURSE PRACTITIONER

## 2022-10-20 PROCEDURE — 85027 COMPLETE CBC AUTOMATED: CPT | Performed by: NURSE PRACTITIONER

## 2022-10-21 LAB
ALBUMIN SERPL-MCNC: 4 G/DL (ref 3.5–5.2)
ALBUMIN/GLOB SERPL: 1.4 G/DL
ALP SERPL-CCNC: 58 U/L (ref 39–117)
ALT SERPL W P-5'-P-CCNC: 18 U/L (ref 1–33)
ANION GAP SERPL CALCULATED.3IONS-SCNC: 13.8 MMOL/L (ref 5–15)
AST SERPL-CCNC: 23 U/L (ref 1–32)
BILIRUB SERPL-MCNC: 0.2 MG/DL (ref 0–1.2)
BUN SERPL-MCNC: 13 MG/DL (ref 8–23)
BUN/CREAT SERPL: 17.8 (ref 7–25)
CALCIUM SPEC-SCNC: 9.9 MG/DL (ref 8.6–10.5)
CHLORIDE SERPL-SCNC: 103 MMOL/L (ref 98–107)
CHOLEST SERPL-MCNC: 238 MG/DL (ref 0–200)
CO2 SERPL-SCNC: 24.2 MMOL/L (ref 22–29)
CREAT SERPL-MCNC: 0.73 MG/DL (ref 0.57–1)
DEPRECATED RDW RBC AUTO: 41.5 FL (ref 37–54)
EGFRCR SERPLBLD CKD-EPI 2021: 91.4 ML/MIN/1.73
ERYTHROCYTE [DISTWIDTH] IN BLOOD BY AUTOMATED COUNT: 11.8 % (ref 12.3–15.4)
GLOBULIN UR ELPH-MCNC: 2.8 GM/DL
GLUCOSE SERPL-MCNC: 95 MG/DL (ref 65–99)
HCT VFR BLD AUTO: 40.1 % (ref 34–46.6)
HDLC SERPL-MCNC: 97 MG/DL (ref 40–60)
HGB BLD-MCNC: 13.5 G/DL (ref 12–15.9)
LDLC SERPL CALC-MCNC: 134 MG/DL (ref 0–100)
LDLC/HDLC SERPL: 1.36 {RATIO}
MCH RBC QN AUTO: 32.1 PG (ref 26.6–33)
MCHC RBC AUTO-ENTMCNC: 33.7 G/DL (ref 31.5–35.7)
MCV RBC AUTO: 95.5 FL (ref 79–97)
PLATELET # BLD AUTO: 293 10*3/MM3 (ref 140–450)
PMV BLD AUTO: 10 FL (ref 6–12)
POTASSIUM SERPL-SCNC: 4.4 MMOL/L (ref 3.5–5.2)
PROT SERPL-MCNC: 6.8 G/DL (ref 6–8.5)
RBC # BLD AUTO: 4.2 10*6/MM3 (ref 3.77–5.28)
SODIUM SERPL-SCNC: 141 MMOL/L (ref 136–145)
TRIGL SERPL-MCNC: 44 MG/DL (ref 0–150)
TSH SERPL DL<=0.05 MIU/L-ACNC: 1.04 UIU/ML (ref 0.27–4.2)
VLDLC SERPL-MCNC: 7 MG/DL (ref 5–40)
WBC NRBC COR # BLD: 8.72 10*3/MM3 (ref 3.4–10.8)

## 2022-10-27 ENCOUNTER — OFFICE VISIT (OUTPATIENT)
Dept: FAMILY MEDICINE CLINIC | Facility: CLINIC | Age: 67
End: 2022-10-27

## 2022-10-27 VITALS
HEART RATE: 99 BPM | WEIGHT: 105 LBS | BODY MASS INDEX: 18.61 KG/M2 | TEMPERATURE: 98.6 F | OXYGEN SATURATION: 92 % | DIASTOLIC BLOOD PRESSURE: 79 MMHG | SYSTOLIC BLOOD PRESSURE: 132 MMHG | HEIGHT: 63 IN

## 2022-10-27 DIAGNOSIS — M81.0 AGE-RELATED OSTEOPOROSIS WITHOUT CURRENT PATHOLOGICAL FRACTURE: ICD-10-CM

## 2022-10-27 DIAGNOSIS — E78.2 MIXED HYPERLIPIDEMIA: Primary | ICD-10-CM

## 2022-10-27 DIAGNOSIS — M19.90 ARTHRITIS: ICD-10-CM

## 2022-10-27 PROCEDURE — 99213 OFFICE O/P EST LOW 20 MIN: CPT | Performed by: NURSE PRACTITIONER

## 2022-10-27 RX ORDER — TRAMADOL HYDROCHLORIDE 50 MG/1
50 TABLET ORAL EVERY 8 HOURS PRN
Qty: 60 TABLET | Refills: 2 | Status: SHIPPED | OUTPATIENT
Start: 2022-10-27 | End: 2023-01-16

## 2022-10-27 RX ORDER — ALENDRONATE SODIUM 70 MG/1
70 TABLET ORAL
Qty: 12 TABLET | Refills: 1 | Status: SHIPPED | OUTPATIENT
Start: 2022-10-27

## 2022-10-27 RX ORDER — ACYCLOVIR 800 MG/1
TABLET ORAL
Qty: 30 TABLET | Refills: 0 | Status: SHIPPED | OUTPATIENT
Start: 2022-10-27

## 2022-10-27 NOTE — PROGRESS NOTES
Chief Complaint  Chief Complaint   Patient presents with   • Follow-up     6M[facility]            Subjective          Charisma Maya presents to [facility] for   History of Present Illness        Hx of Breast cancer, B mastectomy, w recurrence in chest wall. underwent radiation/chemo with bowel disturbance, required illeostomy, then J pouch. Follows with oncology, Dr. Palomares yearly, gastro every 2 years, sigmoidoscopy now Q 7 to 8 years     Arthritis, stable on Celebrex and tramadol as needed, pain reported in ankles, hips, knees.  No longer follows with rheumatology.  Needs medication refill today     OP, on fosamax and calcium, DEXA updated 6/28/2022, unchanged from previous     Hyperlipidemia, denies GI upset, fatigue, chest pain/pressure, exercise intolerance, dyspnea, palpitations, syncope and pedal edema    She reports her insurance will not cover [facility] until after 12 mo on medicare        The following portions of the patient's history were reviewed and updated as appropriate: allergies, current medications, past family history, past medical history, past social history, past surgical history and problem list.    Past Medical History:   Diagnosis Date   • Breast cancer, BRCA2 positive (HCC)    • Chronic lung disease    • Disorder of vitamin B12    • Emphysema lung (HCC)    • History of kidney stones    • Migraine headache    • Osteonecrosis (HCC) 2015   • Osteoporosis 2002   • Osteosclerosis    • Seasonal allergies    • Ulcerative colitis (HCC)      Past Surgical History:   Procedure Laterality Date   • CARPAL TUNNEL RELEASE Bilateral 2000   • CHEST WALL MASS EXCISION Right 04/1993    2 knots removed from chest wall   • COLECTOMY PARTIAL / TOTAL  02/1992    and ileostomy by Dr. Goldstein because of rupture of bowel Feb. 1992   • CREATION / REVISION OF ILEOSTOMY / JEJUNOSTOMY      reversal of ileostomy and J-pouch placement-Dr. Sesay   • IMPLANTATION / REPLACEMENT INFUSION PUMP  2002  "   placement and removal in 2002   • MASTECTOMY Bilateral    • OOPHORECTOMY  1996    Uterus intact   • TRIGGER FINGER RELEASE Right 2002    right thumb trigger release   • TUBAL ABDOMINAL LIGATION  1991     Family History   Problem Relation Age of Onset   • Breast cancer Mother         passed away from metastasis     Social History     Tobacco Use   • Smoking status: Never   • Smokeless tobacco: Never   Substance Use Topics   • Alcohol use: No       Current Outpatient Medications:   •  acyclovir (ZOVIRAX) 800 MG tablet, TAKE 1 TABLET BY MOUTH EVERY 8 HOURS FOR 10 DAYS, Disp: 30 tablet, Rfl: 0  •  alendronate (FOSAMAX) 70 MG tablet, Take 1 tablet by mouth Every 7 (Seven) Days., Disp: 12 tablet, Rfl: 1  •  ALPRAZolam (XANAX) 0.25 MG tablet, Take 0.25 mg by mouth 3 (Three) Times a Day As Needed., Disp: , Rfl:   •  calcium carbonate (OS-TOSIN) 1250 (500 Ca) MG tablet, Take 1 tablet by mouth Daily., Disp: , Rfl:   •  celecoxib (CeleBREX) 200 MG capsule, Take 1 capsule by mouth Daily., Disp: 30 capsule, Rfl: 11  •  Cholecalciferol 1000 units tablet, Take 1,000 Units by mouth Daily., Disp: , Rfl:   •  diphenhydrAMINE HCl (BENADRYL ALLERGY PO), Take  by mouth Daily As Needed., Disp: , Rfl:   •  sertraline (ZOLOFT) 100 MG tablet, Take 200 mg by mouth Daily. #45, Disp: , Rfl: 0  •  traMADol (ULTRAM) 50 MG tablet, Take 1 tablet by mouth Every 8 (Eight) Hours As Needed for Severe Pain., Disp: 60 tablet, Rfl: 2    Objective   Vital Signs:   /79   Pulse 99   Temp 98.6 °F (37 °C)   Ht 160 cm (62.99\")   Wt 47.6 kg (105 lb)   SpO2 92%   BMI 18.61 kg/m²           Physical Exam  Vitals and nursing note reviewed.   Constitutional:       General: She is not in acute distress.     Appearance: She is well-developed. She is not diaphoretic.   Eyes:      Pupils: Pupils are equal, round, and reactive to light.   Neck:      Thyroid: No thyromegaly.      Vascular: No JVD.   Cardiovascular:      Rate and Rhythm: Normal rate and " regular rhythm.      Heart sounds: Normal heart sounds. No murmur heard.  Pulmonary:      Effort: Pulmonary effort is normal. No respiratory distress.      Breath sounds: Normal breath sounds.   Abdominal:      General: Bowel sounds are normal. There is no distension.      Palpations: Abdomen is soft.      Tenderness: There is no abdominal tenderness.   Musculoskeletal:         General: No tenderness. Normal range of motion.      Cervical back: Normal range of motion and neck supple.   Skin:     General: Skin is warm and dry.   Neurological:      Mental Status: She is alert and oriented to person, place, and time.      Sensory: No sensory deficit.   Psychiatric:         Behavior: Behavior normal.         Thought Content: Thought content normal.         Judgment: Judgment normal.          Result Review :     No visits with results within 7 Day(s) from this visit.   Latest known visit with results is:   Clinical Support on 10/20/2022   Component Date Value Ref Range Status   • WBC 10/20/2022 8.72  3.40 - 10.80 10*3/mm3 Final   • RBC 10/20/2022 4.20  3.77 - 5.28 10*6/mm3 Final   • Hemoglobin 10/20/2022 13.5  12.0 - 15.9 g/dL Final   • Hematocrit 10/20/2022 40.1  34.0 - 46.6 % Final   • MCV 10/20/2022 95.5  79.0 - 97.0 fL Final   • MCH 10/20/2022 32.1  26.6 - 33.0 pg Final   • MCHC 10/20/2022 33.7  31.5 - 35.7 g/dL Final   • RDW 10/20/2022 11.8 (L)  12.3 - 15.4 % Final   • RDW-SD 10/20/2022 41.5  37.0 - 54.0 fl Final   • MPV 10/20/2022 10.0  6.0 - 12.0 fL Final   • Platelets 10/20/2022 293  140 - 450 10*3/mm3 Final   • Glucose 10/20/2022 95  65 - 99 mg/dL Final   • BUN 10/20/2022 13  8 - 23 mg/dL Final   • Creatinine 10/20/2022 0.73  0.57 - 1.00 mg/dL Final   • Sodium 10/20/2022 141  136 - 145 mmol/L Final   • Potassium 10/20/2022 4.4  3.5 - 5.2 mmol/L Final   • Chloride 10/20/2022 103  98 - 107 mmol/L Final   • CO2 10/20/2022 24.2  22.0 - 29.0 mmol/L Final   • Calcium 10/20/2022 9.9  8.6 - 10.5 mg/dL Final   • Total  Protein 10/20/2022 6.8  6.0 - 8.5 g/dL Final   • Albumin 10/20/2022 4.00  3.50 - 5.20 g/dL Final   • ALT (SGPT) 10/20/2022 18  1 - 33 U/L Final   • AST (SGOT) 10/20/2022 23  1 - 32 U/L Final   • Alkaline Phosphatase 10/20/2022 58  39 - 117 U/L Final   • Total Bilirubin 10/20/2022 0.2  0.0 - 1.2 mg/dL Final   • Globulin 10/20/2022 2.8  gm/dL Final   • A/G Ratio 10/20/2022 1.4  g/dL Final   • BUN/Creatinine Ratio 10/20/2022 17.8  7.0 - 25.0 Final   • Anion Gap 10/20/2022 13.8  5.0 - 15.0 mmol/L Final   • eGFR 10/20/2022 91.4  >60.0 mL/min/1.73 Final    National Kidney Foundation and American Society of Nephrology (ASN) Task Force recommended calculation based on the Chronic Kidney Disease Epidemiology Collaboration (CKD-EPI) equation refit without adjustment for race.   • Total Cholesterol 10/20/2022 238 (H)  0 - 200 mg/dL Final   • Triglycerides 10/20/2022 44  0 - 150 mg/dL Final   • HDL Cholesterol 10/20/2022 97 (H)  40 - 60 mg/dL Final   • LDL Cholesterol  10/20/2022 134 (H)  0 - 100 mg/dL Final   • VLDL Cholesterol 10/20/2022 7  5 - 40 mg/dL Final   • LDL/HDL Ratio 10/20/2022 1.36   Final   • TSH 10/20/2022 1.040  0.270 - 4.200 uIU/mL Final                  BMI is within normal parameters. No other follow-up for BMI required.           Assessment and Plan    Diagnoses and all orders for this visit:    1. Mixed hyperlipidemia (Primary)  Comments:  Labs reviewed, total and LDL still elevated with very good HDL and low triglycerides.  Low cardiac risk  Continue healthy heart lifestyle and diet    2. Arthritis  Comments:  Stable on Celebrex and tramadol as needed, refill tramadol today.  Orders:  -     traMADol (ULTRAM) 50 MG tablet; Take 1 tablet by mouth Every 8 (Eight) Hours As Needed for Severe Pain.  Dispense: 60 tablet; Refill: 2    3. Age-related osteoporosis without current pathological fracture  Comments:  DEXA up-to-date, continue Fosamax and calcium/D,   Orders:  -     alendronate (FOSAMAX) 70 MG tablet;  Take 1 tablet by mouth Every 7 (Seven) Days.  Dispense: 12 tablet; Refill: 1    Other orders  -     acyclovir (ZOVIRAX) 800 MG tablet; TAKE 1 TABLET BY MOUTH EVERY 8 HOURS FOR 10 DAYS  Dispense: 30 tablet; Refill: 0    Flu and COVID vaccines up-to-date at pharmacy  Recommend Shingrix vaccine    I spent 20 minutes caring for Charisma Maya on this date of service. This time includes time spent by me in the following activities: preparing for the visit, reviewing tests, performing a medically appropriate examination and/or evaluation , counseling and educating the patient/family/caregiver, ordering medications, tests, or procedures and documenting information in the medical record        Follow Up     Return in about 6 months (around 4/27/2023) for Recheck.  Hypertension panel prior.  Patient was given instructions and counseling regarding her condition or for health maintenance advice. Please see specific information pulled into the AVS if appropriate.        Part of this note may be an electronic transcription/translation of spoken language to printed text using the Dragon Dictation System

## 2023-01-16 DIAGNOSIS — M19.90 ARTHRITIS: ICD-10-CM

## 2023-01-16 RX ORDER — TRAMADOL HYDROCHLORIDE 50 MG/1
TABLET ORAL
Qty: 60 TABLET | Refills: 2 | Status: SHIPPED | OUTPATIENT
Start: 2023-01-16 | End: 2023-04-04

## 2023-01-27 ENCOUNTER — TELEPHONE (OUTPATIENT)
Dept: FAMILY MEDICINE CLINIC | Facility: CLINIC | Age: 68
End: 2023-01-27
Payer: MEDICARE

## 2023-01-27 NOTE — TELEPHONE ENCOUNTER
OKAY FOR HUB TO READ/SHARE/ SCHEDULE:    Attempted to contact pt in regard to scheduling AWV, as patient is due/overdue. No answer. No verbal on file. LVMTCB

## 2023-04-03 DIAGNOSIS — M19.90 ARTHRITIS: ICD-10-CM

## 2023-04-04 RX ORDER — TRAMADOL HYDROCHLORIDE 50 MG/1
TABLET ORAL
Qty: 60 TABLET | Refills: 2 | Status: SHIPPED | OUTPATIENT
Start: 2023-04-04

## 2023-04-20 ENCOUNTER — CLINICAL SUPPORT (OUTPATIENT)
Dept: FAMILY MEDICINE CLINIC | Facility: CLINIC | Age: 68
End: 2023-04-20
Payer: MEDICARE

## 2023-04-20 DIAGNOSIS — I10 HYPERTENSION, UNSPECIFIED TYPE: Primary | ICD-10-CM

## 2023-04-20 PROCEDURE — 36415 COLL VENOUS BLD VENIPUNCTURE: CPT | Performed by: NURSE PRACTITIONER

## 2023-04-20 PROCEDURE — 80061 LIPID PANEL: CPT | Performed by: NURSE PRACTITIONER

## 2023-04-20 PROCEDURE — 84443 ASSAY THYROID STIM HORMONE: CPT | Performed by: NURSE PRACTITIONER

## 2023-04-20 PROCEDURE — 80053 COMPREHEN METABOLIC PANEL: CPT | Performed by: NURSE PRACTITIONER

## 2023-04-20 PROCEDURE — 85027 COMPLETE CBC AUTOMATED: CPT | Performed by: NURSE PRACTITIONER

## 2023-04-20 NOTE — PROGRESS NOTES
Venipuncture Blood Specimen Collection  Venipuncture performed in left arm by Taylor Cerda MA with good hemostasis. Patient tolerated the procedure well without complications.   04/20/23   Taylor Cerda MA

## 2023-04-21 LAB
ALBUMIN SERPL-MCNC: 4.2 G/DL (ref 3.5–5.2)
ALBUMIN/GLOB SERPL: 1.4 G/DL
ALP SERPL-CCNC: 62 U/L (ref 39–117)
ALT SERPL W P-5'-P-CCNC: 17 U/L (ref 1–33)
ANION GAP SERPL CALCULATED.3IONS-SCNC: 7.1 MMOL/L (ref 5–15)
AST SERPL-CCNC: 21 U/L (ref 1–32)
BILIRUB SERPL-MCNC: 0.3 MG/DL (ref 0–1.2)
BUN SERPL-MCNC: 13 MG/DL (ref 8–23)
BUN/CREAT SERPL: 18.6 (ref 7–25)
CALCIUM SPEC-SCNC: 10.3 MG/DL (ref 8.6–10.5)
CHLORIDE SERPL-SCNC: 104 MMOL/L (ref 98–107)
CHOLEST SERPL-MCNC: 200 MG/DL (ref 0–200)
CO2 SERPL-SCNC: 28.9 MMOL/L (ref 22–29)
CREAT SERPL-MCNC: 0.7 MG/DL (ref 0.57–1)
DEPRECATED RDW RBC AUTO: 40.4 FL (ref 37–54)
EGFRCR SERPLBLD CKD-EPI 2021: 95.5 ML/MIN/1.73
ERYTHROCYTE [DISTWIDTH] IN BLOOD BY AUTOMATED COUNT: 11.7 % (ref 12.3–15.4)
GLOBULIN UR ELPH-MCNC: 3 GM/DL
GLUCOSE SERPL-MCNC: 103 MG/DL (ref 65–99)
HCT VFR BLD AUTO: 40.6 % (ref 34–46.6)
HDLC SERPL-MCNC: 89 MG/DL (ref 40–60)
HGB BLD-MCNC: 13.4 G/DL (ref 12–15.9)
LDLC SERPL CALC-MCNC: 101 MG/DL (ref 0–100)
LDLC/HDLC SERPL: 1.13 {RATIO}
MCH RBC QN AUTO: 31.5 PG (ref 26.6–33)
MCHC RBC AUTO-ENTMCNC: 33 G/DL (ref 31.5–35.7)
MCV RBC AUTO: 95.5 FL (ref 79–97)
PLATELET # BLD AUTO: 289 10*3/MM3 (ref 140–450)
PMV BLD AUTO: 10.2 FL (ref 6–12)
POTASSIUM SERPL-SCNC: 4.7 MMOL/L (ref 3.5–5.2)
PROT SERPL-MCNC: 7.2 G/DL (ref 6–8.5)
RBC # BLD AUTO: 4.25 10*6/MM3 (ref 3.77–5.28)
SODIUM SERPL-SCNC: 140 MMOL/L (ref 136–145)
TRIGL SERPL-MCNC: 51 MG/DL (ref 0–150)
TSH SERPL DL<=0.05 MIU/L-ACNC: 1.12 UIU/ML (ref 0.27–4.2)
VLDLC SERPL-MCNC: 10 MG/DL (ref 5–40)
WBC NRBC COR # BLD: 9.78 10*3/MM3 (ref 3.4–10.8)

## 2023-04-27 ENCOUNTER — OFFICE VISIT (OUTPATIENT)
Dept: FAMILY MEDICINE CLINIC | Facility: CLINIC | Age: 68
End: 2023-04-27
Payer: MEDICARE

## 2023-04-27 VITALS
BODY MASS INDEX: 17.61 KG/M2 | HEART RATE: 99 BPM | HEIGHT: 63 IN | DIASTOLIC BLOOD PRESSURE: 78 MMHG | SYSTOLIC BLOOD PRESSURE: 124 MMHG | WEIGHT: 99.4 LBS | OXYGEN SATURATION: 97 %

## 2023-04-27 DIAGNOSIS — Z85.3 HISTORY OF BREAST CANCER: ICD-10-CM

## 2023-04-27 DIAGNOSIS — Z00.00 MEDICARE ANNUAL WELLNESS VISIT, INITIAL: Primary | ICD-10-CM

## 2023-04-27 DIAGNOSIS — M81.0 AGE-RELATED OSTEOPOROSIS WITHOUT CURRENT PATHOLOGICAL FRACTURE: ICD-10-CM

## 2023-04-27 DIAGNOSIS — E78.2 MIXED HYPERLIPIDEMIA: ICD-10-CM

## 2023-04-27 DIAGNOSIS — I10 HYPERTENSION, UNSPECIFIED TYPE: ICD-10-CM

## 2023-04-27 DIAGNOSIS — M19.90 ARTHRITIS: ICD-10-CM

## 2023-04-27 DIAGNOSIS — F43.0 STRESS REACTION: ICD-10-CM

## 2023-04-27 RX ORDER — ALENDRONATE SODIUM 70 MG/1
70 TABLET ORAL
Qty: 12 TABLET | Refills: 1 | Status: SHIPPED | OUTPATIENT
Start: 2023-04-27

## 2023-04-27 RX ORDER — ACYCLOVIR 800 MG/1
TABLET ORAL
Qty: 30 TABLET | Refills: 2 | Status: SHIPPED | OUTPATIENT
Start: 2023-04-27

## 2023-04-27 RX ORDER — CELECOXIB 200 MG/1
200 CAPSULE ORAL DAILY
Qty: 90 CAPSULE | Refills: 3 | Status: SHIPPED | OUTPATIENT
Start: 2023-04-27

## 2023-04-27 NOTE — PROGRESS NOTES
The ABCs of the Annual Wellness Visit  Welcome to Medicare Visit    Subjective     Charisma Maya is a 68 y.o. female who presents for a  Welcome to Medicare Visit and to follow-up on chronic conditions    Hx of Breast cancer, B mastectomy, w recurrence in chest wall. underwent radiation/chemo with bowel disturbance, required illeostomy, then J pouch. Follows with oncology, Dr. Palomares yearly, gastro every 2 years, sigmoidoscopy now Q 7 to 8 years     Arthritis of multiple joints, ankles, hips, knees, stable on Celebrex and tramadol as needed, no longer follows with rheumatology.  Needs medication refill today     OP, on fosamax and calcium, last DEXA 6/28/2022     Hyperlipidemia, denies GI upset, fatigue, chest pain/pressure, exercise intolerance, dyspnea, palpitations, syncope and pedal edema     Anxiety, she is under more stress lately and typically does not eat as much when under stress, she has lost 5 pounds, she follows with psychiatry on sertraline and alprazolam as needed        The following portions of the patient's history were reviewed and   updated as appropriate: allergies, current medications, past family history, past medical history, past social history, past surgical history and problem list.     Compared to one year ago, the patient feels her physical   health is the same.    Compared to one year ago, the patient feels her mental   health is the same.    Recent Hospitalizations:  She was not admitted to the hospital during the last year.       Current Medical Providers:  Patient Care Team:  Katt Dumont APRN as PCP - General (Nurse Practitioner)    Outpatient Medications Prior to Visit   Medication Sig Dispense Refill   • ALPRAZolam (XANAX) 0.25 MG tablet Take 1 tablet by mouth 3 (Three) Times a Day As Needed.     • calcium carbonate (OS-TOSIN) 1250 (500 Ca) MG tablet Take 1 tablet by mouth Daily.     • Cholecalciferol 1000 units tablet Take 1 tablet by mouth Daily.     • diphenhydrAMINE HCl  "(BENADRYL ALLERGY PO) Take  by mouth Daily As Needed.     • sertraline (ZOLOFT) 100 MG tablet Take 2 tablets by mouth Daily. #45  0   • traMADol (ULTRAM) 50 MG tablet TAKE 1 TABLET BY MOUTH EVERY 8 HOURS AS NEEDED FOR SEVERE PAIN 60 tablet 2   • acyclovir (ZOVIRAX) 800 MG tablet TAKE 1 TABLET BY MOUTH EVERY 8 HOURS FOR 10 DAYS 30 tablet 0   • alendronate (FOSAMAX) 70 MG tablet Take 1 tablet by mouth Every 7 (Seven) Days. 12 tablet 1   • celecoxib (CeleBREX) 200 MG capsule Take 1 capsule by mouth Daily. 30 capsule 11     No facility-administered medications prior to visit.       Opioid medication/s are on active medication list.  and I have evaluated her active treatment plan and pain score trends (see table).  There were no vitals filed for this visit.  I have reviewed the chart for potential of high risk medication and harmful drug interactions in the elderly.            Aspirin is not on active medication list.  Aspirin use is not indicated based on review of current medical condition/s. Risk of harm outweighs potential benefits.  .    Patient Active Problem List   Diagnosis   • History of bilateral breast cancer   • BRCA2 gene mutation positive in female hetro   • Osteoporosis   • Anxiety with depression   • Encounter for general adult medical examination without abnormal findings   • Fibromyalgia   • Hx of ulcerative colitis   • Migraine headache   • Need for prophylactic vaccination against Streptococcus pneumoniae (pneumococcus)   • Osteoarthritis   • Weight loss     Advance Care Planning   Advance Care Planning     Advance Directive is not on file.  ACP discussion was held with the patient during this visit. Patient does not have an advance directive, declines further assistance.       Objective   Vitals:    04/27/23 1357   BP: 124/78   BP Location: Left arm   Patient Position: Sitting   Cuff Size: Small Adult   Pulse: 99   SpO2: 97%   Weight: 45.1 kg (99 lb 6.4 oz)   Height: 160 cm (62.99\")     Estimated " "body mass index is 17.61 kg/m² as calculated from the following:    Height as of this encounter: 160 cm (62.99\").    Weight as of this encounter: 45.1 kg (99 lb 6.4 oz).    BMI is within normal parameters. No other follow-up for BMI required.      Does the patient have evidence of cognitive impairment?   No    Lab Results   Component Value Date    TRIG 51 04/20/2023    HDL 89 (H) 04/20/2023     (H) 04/20/2023    VLDL 10 04/20/2023       Procedures   Physical Exam  Constitutional:       General: She is not in acute distress.     Appearance: Normal appearance. She is well-developed. She is not ill-appearing or diaphoretic.   HENT:      Head: Normocephalic.   Eyes:      Conjunctiva/sclera: Conjunctivae normal.      Pupils: Pupils are equal, round, and reactive to light.   Neck:      Thyroid: No thyromegaly.      Vascular: No JVD.   Cardiovascular:      Rate and Rhythm: Normal rate and regular rhythm.      Heart sounds: Normal heart sounds. No murmur heard.  Pulmonary:      Effort: Pulmonary effort is normal. No respiratory distress.      Breath sounds: Normal breath sounds. No wheezing or rhonchi.   Abdominal:      General: Bowel sounds are normal. There is no distension.      Palpations: Abdomen is soft.      Tenderness: There is no abdominal tenderness.   Musculoskeletal:         General: No swelling or tenderness. Normal range of motion.      Cervical back: Normal range of motion and neck supple. No tenderness.   Lymphadenopathy:      Cervical: No cervical adenopathy.   Skin:     General: Skin is warm and dry.      Coloration: Skin is not jaundiced.      Findings: No erythema or rash.   Neurological:      General: No focal deficit present.      Mental Status: She is alert and oriented to person, place, and time. Mental status is at baseline.      Sensory: No sensory deficit.   Psychiatric:         Mood and Affect: Mood normal.         Behavior: Behavior normal.         Thought Content: Thought content normal. "         Judgment: Judgment normal.              HEALTH RISK ASSESSMENT    Smoking Status:  Social History     Tobacco Use   Smoking Status Never   Smokeless Tobacco Never     Alcohol Consumption:  Social History     Substance and Sexual Activity   Alcohol Use No       Fall Risk Screen:    AHDLEYADI Fall Risk Assessment was completed, and patient is at LOW risk for falls.Assessment completed on:2023    Depression Screen:       2023     1:49 PM   PHQ-2/PHQ-9 Depression Screening   Little Interest or Pleasure in Doing Things 0-->not at all   Feeling Down, Depressed or Hopeless 0-->not at all   PHQ-9: Brief Depression Severity Measure Score 0       Health Habits and Functional and Cognitive Screenin/27/2023     1:00 PM   Functional & Cognitive Status   Do you have difficulty preparing food and eating? No   Do you have difficulty bathing yourself, getting dressed or grooming yourself? No   Do you have difficulty using the toilet? No   Do you have difficulty moving around from place to place? No   Do you have trouble with steps or getting out of a bed or a chair? No   Do you need help using the phone?  No   Are you deaf or do you have serious difficulty hearing?  No   Do you need help with transportation? No   Do you need help shopping? No   Do you need help preparing meals?  No   Do you need help with housework?  No   Do you need help with laundry? No   Do you need help taking your medications? No   Do you need help managing money? No   Do you ever drive or ride in a car without wearing a seat belt? No   Have you felt unusual stress, anger or loneliness in the last month? Yes   Who do you live with? Spouse   If you need help, do you have trouble finding someone available to you? No   Have you been bothered in the last four weeks by sexual problems? No   Do you have difficulty concentrating, remembering or making decisions? No       Visual Acuity:    Vision Screening    Right eye Left eye Both eyes    Without correction      With correction          ATTENTION  What is the year: correct  What is the month of the year: correct  What is the day of the week?: correct  What is the date?: correct  MEMORY  Repeat address three times, only score third attempt: [address], [place]: 7  HOW MANY ANIMALS DID THE PATIENT NAME  Verbal Fluency -- Animal Names (0-25): 17-21  CLOCK DRAWING  Clock Drawing: All Correct  MEMORY RECALL  Tell me what you remember about that name and address we were repeating at the beginnin  ACE TOTAL SCORE  Total ACE Score - <25/30 strongly suggests cognitive impairment; <21/30 almost certainly shows dementia: 29        Age-appropriate Screening Schedule:  Refer to the list below for future screening recommendations based on patient's age, sex and/or medical conditions. Orders for these recommended tests are listed in the plan section. The patient has been provided with a written plan.    Health Maintenance   Topic Date Due   • PAP SMEAR  Never done   • ZOSTER VACCINE (2 of 2) 2023 (Originally 2019)   • Pneumococcal Vaccine 65+ (2 - PCV) 2024 (Originally 2023)   • TDAP/TD VACCINES (1 - Tdap) 2024 (Originally 1976)   • INFLUENZA VACCINE  2023   • LIPID PANEL  2024   • ANNUAL WELLNESS VISIT  2024   • DXA SCAN  2024   • HEPATITIS C SCREENING  Completed   • COVID-19 Vaccine  Completed   • COLON CANCER SCREENING 5 YEAR SIGMOIDOSCOPY  Discontinued        CMS Preventative Services Quick Reference  Risk Factors Identified During Encounter    Chronic Pain: Natural history and expected course discussed. Questions answered.  Depression/Dysphoria: Current medication is effective, no change recommended  Immunizations Discussed/Encouraged: Tdap, Prevnar 20 (Pneumococcal 20-valent conjugate) and Shingrix  The above risks/problems have been discussed with the patient.  Pertinent information has been shared  with the patient in the After Visit Summary.  Follow up plans and orders are seen below in the Assessment/Plan Section.    Diagnoses and all orders for this visit:    1. Medicare annual wellness visit, initial (Primary)  -     ECG 12 Lead    2. Arthritis  Comments:  Stable on Celebrex and tramadol, refill tramadol when needed.   Orders:  -     celecoxib (CeleBREX) 200 MG capsule; Take 1 capsule by mouth Daily.  Dispense: 90 capsule; Refill: 3    3. Age-related osteoporosis without current pathological fracture  Comments:  DEXA up-to-date, continue Fosamax and calcium/D,   Orders:  -     alendronate (FOSAMAX) 70 MG tablet; Take 1 tablet by mouth Every 7 (Seven) Days.  Dispense: 12 tablet; Refill: 1    4. Mixed hyperlipidemia    5. History of breast cancer    6. Hypertension, unspecified type    7. Stress reaction    Other orders  -     acyclovir (ZOVIRAX) 800 MG tablet; TAKE 1 TABLET BY MOUTH EVERY 8 HOURS FOR 10 DAYS  Dispense: 30 tablet; Refill: 2      Labs reviewed with patient, stable.  We will plan to check yearly  Keep follow-ups with oncology and psychiatry as directed  rec small more freq meals, and add 35-40 grams protein to diet, she has lost 5 lbs  Patient will get vaccines: Tdap, pneumonia 20 and Shingrix at pharmacy   Recommend Pap smear  Age appropriate preventative counseling provided, including healthy lifestyle modifications and exercise        Follow Up:     Return in about 6 months (around 10/27/2023) for Recheck.    subsequent Medicare Visit in one year    An After Visit Summary and PPPS were made available to the patient.      EMR Dragon transcription disclaimer:  Part of this note may be an electronic transcription/translation of spoken language to printed text using the Dragon Dictation System.

## 2023-09-13 DIAGNOSIS — M19.90 ARTHRITIS: ICD-10-CM

## 2023-09-14 RX ORDER — TRAMADOL HYDROCHLORIDE 50 MG/1
TABLET ORAL
Qty: 60 TABLET | Refills: 2 | Status: SHIPPED | OUTPATIENT
Start: 2023-09-14

## 2023-11-02 ENCOUNTER — OFFICE VISIT (OUTPATIENT)
Dept: FAMILY MEDICINE CLINIC | Facility: CLINIC | Age: 68
End: 2023-11-02
Payer: MEDICARE

## 2023-11-02 VITALS
WEIGHT: 94.4 LBS | SYSTOLIC BLOOD PRESSURE: 120 MMHG | HEIGHT: 63 IN | DIASTOLIC BLOOD PRESSURE: 68 MMHG | HEART RATE: 92 BPM | BODY MASS INDEX: 16.73 KG/M2 | OXYGEN SATURATION: 97 %

## 2023-11-02 DIAGNOSIS — Z00.00 PREVENTATIVE HEALTH CARE: ICD-10-CM

## 2023-11-02 DIAGNOSIS — M81.0 AGE-RELATED OSTEOPOROSIS WITHOUT CURRENT PATHOLOGICAL FRACTURE: ICD-10-CM

## 2023-11-02 DIAGNOSIS — F43.0 STRESS REACTION: ICD-10-CM

## 2023-11-02 DIAGNOSIS — R63.4 WEIGHT LOSS, UNINTENTIONAL: ICD-10-CM

## 2023-11-02 DIAGNOSIS — M19.90 ARTHRITIS: ICD-10-CM

## 2023-11-02 DIAGNOSIS — E78.2 MIXED HYPERLIPIDEMIA: Primary | ICD-10-CM

## 2023-11-02 DIAGNOSIS — Z23 NEED FOR PNEUMOCOCCAL VACCINATION: ICD-10-CM

## 2023-11-02 RX ORDER — ALENDRONATE SODIUM 70 MG/1
70 TABLET ORAL
Qty: 12 TABLET | Refills: 1 | Status: SHIPPED | OUTPATIENT
Start: 2023-11-02

## 2023-11-02 NOTE — PROGRESS NOTES
"Chief Complaint  Chief Complaint   Patient presents with    Follow-up     6 month follow up hyperlipidemia            Subjective          Charisma Maya presents to [facility] for   History of Present Illness    Hx of Breast cancer/bilateral mastectomy, w/ recurrence in the chest wall. She underwent radiation/chemo with bowel disturbance, required illeostomy, then J pouch. Follows with oncology, Dr. Palomares yearly, gastro every 2 years, sigmoidoscopy now Q 7 to 8 years. She reports frequent chronic diarrhea, trying to eat more protein, tried Ensure but could not tolerate, worsened diarrhea.      Arthritis of multiple joints, ankles, hips, knees, stable on Celebrex and tramadol as needed, no longer follows with rheumatology.      Osteoporosis, on fosamax weekly and calcium, last DEXA 6/28/2022     Hyperlipidemia, stable labs in April. She denies GI upset, fatigue, chest pain/pressure, exercise intolerance, dyspnea, palpitations, syncope and pedal edema     Anxiety, she is under more stress, states \"I stay stressed\" and typically does not eat as much when under stress, she has lost another 5 pounds, she follows with psychiatry Dr. Romero, on sertraline and alprazolam as needed      The following portions of the patient's history were reviewed and updated as appropriate: allergies, current medications, past family history, past medical history, past social history, past surgical history and problem list.    Past Medical History:   Diagnosis Date    Breast cancer, BRCA2 positive     Chronic lung disease     Disorder of vitamin B12     Emphysema lung     History of kidney stones     Migraine headache     Osteonecrosis 2015    Osteoporosis 2002    Osteosclerosis     Seasonal allergies     Ulcerative colitis      Past Surgical History:   Procedure Laterality Date    CARPAL TUNNEL RELEASE Bilateral 2000    CHEST WALL MASS EXCISION Right 04/1993    2 knots removed from chest wall    COLECTOMY " PARTIAL / TOTAL  02/1992    and ileostomy by Dr. Goldstein because of rupture of bowel Feb. 1992    CREATION / REVISION OF ILEOSTOMY / JEJUNOSTOMY      reversal of ileostomy and J-pouch placement-Dr. Sesay    IMPLANTATION / REPLACEMENT INFUSION PUMP  2002    placement and removal in 2002    MASTECTOMY Bilateral     OOPHORECTOMY  1996    Uterus intact    TRIGGER FINGER RELEASE Right 2002    right thumb trigger release    TUBAL ABDOMINAL LIGATION  1991     Family History   Problem Relation Age of Onset    Breast cancer Mother         passed away from metastasis     Social History     Tobacco Use    Smoking status: Never    Smokeless tobacco: Never   Substance Use Topics    Alcohol use: No       Current Outpatient Medications:     acyclovir (ZOVIRAX) 800 MG tablet, TAKE 1 TABLET BY MOUTH EVERY 8 HOURS FOR 10 DAYS, Disp: 30 tablet, Rfl: 2    alendronate (FOSAMAX) 70 MG tablet, Take 1 tablet by mouth Every 7 (Seven) Days., Disp: 12 tablet, Rfl: 1    ALPRAZolam (XANAX) 0.25 MG tablet, Take 1 tablet by mouth 3 (Three) Times a Day As Needed., Disp: , Rfl:     calcium carbonate (OS-TOSIN) 1250 (500 Ca) MG tablet, Take 1 tablet by mouth Daily., Disp: , Rfl:     celecoxib (CeleBREX) 200 MG capsule, Take 1 capsule by mouth Daily., Disp: 90 capsule, Rfl: 3    Cholecalciferol 1000 units tablet, Take 1 tablet by mouth Daily., Disp: , Rfl:     diphenhydrAMINE HCl (BENADRYL ALLERGY PO), Take  by mouth Daily As Needed., Disp: , Rfl:     sertraline (ZOLOFT) 100 MG tablet, Take 2 tablets by mouth Daily. #45, Disp: , Rfl: 0    traMADol (ULTRAM) 50 MG tablet, TAKE 1 TABLET BY MOUTH EVERY 8 HOURS AS NEEDED FOR SEVERE PAIN, Disp: 60 tablet, Rfl: 2    Pneumococcal 20-Elisabeth Conj Vacc (PREVNAR-20) 0.5 ML suspension prefilled syringe vaccine, Inject 0.5 mL into the appropriate muscle as directed by prescriber 1 (One) Time for 1 dose., Disp: 0.5 mL, Rfl: 0    Objective   Vital Signs:   /68 (BP Location: Left arm, Patient Position: Sitting,  "Cuff Size: Adult)   Pulse 92   Ht 160 cm (63\")   Wt 42.8 kg (94 lb 6.4 oz)   SpO2 97%   BMI 16.72 kg/m²           Physical Exam  Constitutional:       General: She is not in acute distress.     Appearance: Normal appearance. She is well-developed. She is not ill-appearing or diaphoretic.   HENT:      Head: Normocephalic.   Eyes:      Conjunctiva/sclera: Conjunctivae normal.      Pupils: Pupils are equal, round, and reactive to light.   Neck:      Thyroid: No thyromegaly.      Vascular: No JVD.   Cardiovascular:      Rate and Rhythm: Normal rate and regular rhythm.      Heart sounds: Normal heart sounds. No murmur heard.  Pulmonary:      Effort: Pulmonary effort is normal. No respiratory distress.      Breath sounds: Normal breath sounds. No wheezing or rhonchi.   Abdominal:      General: Bowel sounds are normal. There is no distension.      Palpations: Abdomen is soft.      Tenderness: There is no abdominal tenderness.   Musculoskeletal:         General: No swelling or tenderness. Normal range of motion.      Cervical back: Normal range of motion and neck supple. No tenderness.   Lymphadenopathy:      Cervical: No cervical adenopathy.   Skin:     General: Skin is warm and dry.      Coloration: Skin is not jaundiced.      Findings: No erythema or rash.   Neurological:      General: No focal deficit present.      Mental Status: She is alert and oriented to person, place, and time. Mental status is at baseline.      Sensory: No sensory deficit.   Psychiatric:         Mood and Affect: Mood normal.         Behavior: Behavior normal.         Thought Content: Thought content normal.         Judgment: Judgment normal.          Result Review :     No visits with results within 7 Day(s) from this visit.   Latest known visit with results is:   Clinical Support on 04/20/2023   Component Date Value Ref Range Status    Glucose 04/20/2023 103 (H)  65 - 99 mg/dL Final    BUN 04/20/2023 13  8 - 23 mg/dL Final    Creatinine " 04/20/2023 0.70  0.57 - 1.00 mg/dL Final    Sodium 04/20/2023 140  136 - 145 mmol/L Final    Potassium 04/20/2023 4.7  3.5 - 5.2 mmol/L Final    Chloride 04/20/2023 104  98 - 107 mmol/L Final    CO2 04/20/2023 28.9  22.0 - 29.0 mmol/L Final    Calcium 04/20/2023 10.3  8.6 - 10.5 mg/dL Final    Total Protein 04/20/2023 7.2  6.0 - 8.5 g/dL Final    Albumin 04/20/2023 4.2  3.5 - 5.2 g/dL Final    ALT (SGPT) 04/20/2023 17  1 - 33 U/L Final    AST (SGOT) 04/20/2023 21  1 - 32 U/L Final    Alkaline Phosphatase 04/20/2023 62  39 - 117 U/L Final    Total Bilirubin 04/20/2023 0.3  0.0 - 1.2 mg/dL Final    Globulin 04/20/2023 3.0  gm/dL Final    A/G Ratio 04/20/2023 1.4  g/dL Final    BUN/Creatinine Ratio 04/20/2023 18.6  7.0 - 25.0 Final    Anion Gap 04/20/2023 7.1  5.0 - 15.0 mmol/L Final    eGFR 04/20/2023 95.5  >60.0 mL/min/1.73 Final    WBC 04/20/2023 9.78  3.40 - 10.80 10*3/mm3 Final    RBC 04/20/2023 4.25  3.77 - 5.28 10*6/mm3 Final    Hemoglobin 04/20/2023 13.4  12.0 - 15.9 g/dL Final    Hematocrit 04/20/2023 40.6  34.0 - 46.6 % Final    MCV 04/20/2023 95.5  79.0 - 97.0 fL Final    MCH 04/20/2023 31.5  26.6 - 33.0 pg Final    MCHC 04/20/2023 33.0  31.5 - 35.7 g/dL Final    RDW 04/20/2023 11.7 (L)  12.3 - 15.4 % Final    RDW-SD 04/20/2023 40.4  37.0 - 54.0 fl Final    MPV 04/20/2023 10.2  6.0 - 12.0 fL Final    Platelets 04/20/2023 289  140 - 450 10*3/mm3 Final    Total Cholesterol 04/20/2023 200  0 - 200 mg/dL Final    Triglycerides 04/20/2023 51  0 - 150 mg/dL Final    HDL Cholesterol 04/20/2023 89 (H)  40 - 60 mg/dL Final    LDL Cholesterol  04/20/2023 101 (H)  0 - 100 mg/dL Final    VLDL Cholesterol 04/20/2023 10  5 - 40 mg/dL Final    LDL/HDL Ratio 04/20/2023 1.13   Final    TSH 04/20/2023 1.120  0.270 - 4.200 uIU/mL Final                  BMI is below normal parameters (malnutrition). Recommendations: Recommend increase protein in the diet to 40 g/day           Assessment and Plan    Diagnoses and all orders for  this visit:    1. Mixed hyperlipidemia (Primary)  Comments:  Stable in April, will check yearly    2. Age-related osteoporosis without current pathological fracture  Comments:  DEXA up-to-date, continue Fosamax and calcium/D,   Orders:  -     alendronate (FOSAMAX) 70 MG tablet; Take 1 tablet by mouth Every 7 (Seven) Days.  Dispense: 12 tablet; Refill: 1    3. Stress reaction  Comments:  Continue sertraline  Continue with Dr. Castro  Try to work on stress relieving measures    4. Arthritis  Comments:  Stable, continue Celebrex, refill tramadol when needed    5. Weight loss, unintentional  Comments:  Could not tolerate Ensure  Recommend protein powder to make smoothie for milkshake, 40 grams protein/day    6. Need for pneumococcal vaccination  -     Pneumococcal 20-Elisabeth Conj Vacc (PREVNAR-20) 0.5 ML suspension prefilled syringe vaccine; Inject 0.5 mL into the appropriate muscle as directed by prescriber 1 (One) Time for 1 dose.  Dispense: 0.5 mL; Refill: 0    7. Preventative health care  Comments:  pt to get COVID-19 booster, influenza, pneumo 20 and Shingrix No. 2 at pharmacy  We will plan labs for yearly exam next visit        Rec shingrix #2 separate from other vaccines   Planning to get covid and flu at [facility].       I spent 30 minutes caring for Charisma Maya on this date of service. This time includes time spent by me in the following activities: preparing for the visit, reviewing tests, performing a medically appropriate examination and/or evaluation , counseling and educating the patient/family/caregiver, ordering medications, tests, or procedures and documenting information in the medical record        Follow Up     Return in about 6 months (around 5/2/2024) for Recheck, Annual medicare wellness, HTN panel prior .  Patient was given instructions and counseling regarding her condition or for health maintenance advice. Please see specific information pulled into the AVS if appropriate.        Part of this  note may be an electronic transcription/translation of spoken language to printed text using the Dragon Dictation System

## 2023-11-30 DIAGNOSIS — M19.90 ARTHRITIS: ICD-10-CM

## 2023-11-30 RX ORDER — TRAMADOL HYDROCHLORIDE 50 MG/1
50 TABLET ORAL EVERY 8 HOURS PRN
Qty: 60 TABLET | Refills: 2 | Status: SHIPPED | OUTPATIENT
Start: 2023-11-30

## 2024-01-31 DIAGNOSIS — M19.90 ARTHRITIS: ICD-10-CM

## 2024-01-31 RX ORDER — TRAMADOL HYDROCHLORIDE 50 MG/1
50 TABLET ORAL EVERY 8 HOURS PRN
Qty: 60 TABLET | Refills: 2 | Status: SHIPPED | OUTPATIENT
Start: 2024-01-31

## 2024-04-15 DIAGNOSIS — M19.90 ARTHRITIS: ICD-10-CM

## 2024-04-17 RX ORDER — TRAMADOL HYDROCHLORIDE 50 MG/1
50 TABLET ORAL EVERY 8 HOURS PRN
Qty: 60 TABLET | Refills: 2 | Status: SHIPPED | OUTPATIENT
Start: 2024-04-17

## 2024-04-18 DIAGNOSIS — E78.2 MIXED HYPERLIPIDEMIA: Primary | ICD-10-CM

## 2024-06-25 ENCOUNTER — LAB (OUTPATIENT)
Dept: FAMILY MEDICINE CLINIC | Facility: CLINIC | Age: 69
End: 2024-06-25
Payer: MEDICARE

## 2024-06-25 PROCEDURE — 85027 COMPLETE CBC AUTOMATED: CPT | Performed by: NURSE PRACTITIONER

## 2024-06-25 PROCEDURE — 80053 COMPREHEN METABOLIC PANEL: CPT | Performed by: NURSE PRACTITIONER

## 2024-06-25 PROCEDURE — 80061 LIPID PANEL: CPT | Performed by: NURSE PRACTITIONER

## 2024-06-25 PROCEDURE — 84443 ASSAY THYROID STIM HORMONE: CPT | Performed by: NURSE PRACTITIONER

## 2024-06-26 ENCOUNTER — OFFICE VISIT (OUTPATIENT)
Dept: FAMILY MEDICINE CLINIC | Facility: CLINIC | Age: 69
End: 2024-06-26
Payer: MEDICARE

## 2024-06-26 VITALS
BODY MASS INDEX: 15.73 KG/M2 | SYSTOLIC BLOOD PRESSURE: 117 MMHG | WEIGHT: 88.8 LBS | HEART RATE: 85 BPM | TEMPERATURE: 98.6 F | DIASTOLIC BLOOD PRESSURE: 69 MMHG | HEIGHT: 63 IN | OXYGEN SATURATION: 98 %

## 2024-06-26 DIAGNOSIS — Z15.02 BRCA2 GENE MUTATION POSITIVE IN FEMALE: Primary | ICD-10-CM

## 2024-06-26 DIAGNOSIS — M81.0 AGE-RELATED OSTEOPOROSIS WITHOUT CURRENT PATHOLOGICAL FRACTURE: ICD-10-CM

## 2024-06-26 DIAGNOSIS — Z15.09 BRCA2 GENE MUTATION POSITIVE IN FEMALE: Primary | ICD-10-CM

## 2024-06-26 DIAGNOSIS — M19.90 ARTHRITIS: ICD-10-CM

## 2024-06-26 DIAGNOSIS — E78.2 MIXED HYPERLIPIDEMIA: ICD-10-CM

## 2024-06-26 DIAGNOSIS — Z15.01 BRCA2 GENE MUTATION POSITIVE IN FEMALE: Primary | ICD-10-CM

## 2024-06-26 DIAGNOSIS — F41.8 ANXIETY WITH DEPRESSION: ICD-10-CM

## 2024-06-26 DIAGNOSIS — R63.4 WEIGHT LOSS: ICD-10-CM

## 2024-06-26 DIAGNOSIS — M15.9 PRIMARY OSTEOARTHRITIS INVOLVING MULTIPLE JOINTS: ICD-10-CM

## 2024-06-26 DIAGNOSIS — Z85.3 HISTORY OF BILATERAL BREAST CANCER: ICD-10-CM

## 2024-06-26 DIAGNOSIS — Z00.00 MEDICARE ANNUAL WELLNESS VISIT, SUBSEQUENT: ICD-10-CM

## 2024-06-26 LAB
ALBUMIN SERPL-MCNC: 4.4 G/DL (ref 3.5–5.2)
ALBUMIN/GLOB SERPL: 1.5 G/DL
ALP SERPL-CCNC: 62 U/L (ref 39–117)
ALT SERPL W P-5'-P-CCNC: 17 U/L (ref 1–33)
ANION GAP SERPL CALCULATED.3IONS-SCNC: 11 MMOL/L (ref 5–15)
AST SERPL-CCNC: 24 U/L (ref 1–32)
BILIRUB SERPL-MCNC: 0.3 MG/DL (ref 0–1.2)
BUN SERPL-MCNC: 14 MG/DL (ref 8–23)
BUN/CREAT SERPL: 21.5 (ref 7–25)
CALCIUM SPEC-SCNC: 9.5 MG/DL (ref 8.6–10.5)
CHLORIDE SERPL-SCNC: 105 MMOL/L (ref 98–107)
CHOLEST SERPL-MCNC: 214 MG/DL (ref 0–200)
CO2 SERPL-SCNC: 27 MMOL/L (ref 22–29)
CREAT SERPL-MCNC: 0.65 MG/DL (ref 0.57–1)
DEPRECATED RDW RBC AUTO: 40.8 FL (ref 37–54)
EGFRCR SERPLBLD CKD-EPI 2021: 96.6 ML/MIN/1.73
ERYTHROCYTE [DISTWIDTH] IN BLOOD BY AUTOMATED COUNT: 11.5 % (ref 12.3–15.4)
GLOBULIN UR ELPH-MCNC: 2.9 GM/DL
GLUCOSE SERPL-MCNC: 87 MG/DL (ref 65–99)
HCT VFR BLD AUTO: 41.1 % (ref 34–46.6)
HDLC SERPL-MCNC: 92 MG/DL (ref 40–60)
HGB BLD-MCNC: 13.3 G/DL (ref 12–15.9)
LDLC SERPL CALC-MCNC: 113 MG/DL (ref 0–100)
LDLC/HDLC SERPL: 1.21 {RATIO}
MCH RBC QN AUTO: 31.7 PG (ref 26.6–33)
MCHC RBC AUTO-ENTMCNC: 32.4 G/DL (ref 31.5–35.7)
MCV RBC AUTO: 98.1 FL (ref 79–97)
PLATELET # BLD AUTO: 306 10*3/MM3 (ref 140–450)
PMV BLD AUTO: 9.9 FL (ref 6–12)
POTASSIUM SERPL-SCNC: 4.6 MMOL/L (ref 3.5–5.2)
PROT SERPL-MCNC: 7.3 G/DL (ref 6–8.5)
RBC # BLD AUTO: 4.19 10*6/MM3 (ref 3.77–5.28)
SODIUM SERPL-SCNC: 143 MMOL/L (ref 136–145)
TRIGL SERPL-MCNC: 52 MG/DL (ref 0–150)
TSH SERPL DL<=0.05 MIU/L-ACNC: 1.45 UIU/ML (ref 0.27–4.2)
VLDLC SERPL-MCNC: 9 MG/DL (ref 5–40)
WBC NRBC COR # BLD AUTO: 7.97 10*3/MM3 (ref 3.4–10.8)

## 2024-06-26 PROCEDURE — 1160F RVW MEDS BY RX/DR IN RCRD: CPT | Performed by: NURSE PRACTITIONER

## 2024-06-26 PROCEDURE — G0439 PPPS, SUBSEQ VISIT: HCPCS | Performed by: NURSE PRACTITIONER

## 2024-06-26 PROCEDURE — 1126F AMNT PAIN NOTED NONE PRSNT: CPT | Performed by: NURSE PRACTITIONER

## 2024-06-26 PROCEDURE — 1159F MED LIST DOCD IN RCRD: CPT | Performed by: NURSE PRACTITIONER

## 2024-06-26 RX ORDER — ALENDRONATE SODIUM 70 MG/1
70 TABLET ORAL
Qty: 12 TABLET | Refills: 1 | Status: SHIPPED | OUTPATIENT
Start: 2024-06-26

## 2024-06-26 RX ORDER — ACYCLOVIR 800 MG/1
TABLET ORAL
Qty: 30 TABLET | Refills: 2 | Status: SHIPPED | OUTPATIENT
Start: 2024-06-26

## 2024-06-26 RX ORDER — TRAMADOL HYDROCHLORIDE 50 MG/1
50 TABLET ORAL EVERY 8 HOURS PRN
Qty: 60 TABLET | Refills: 2 | Status: SHIPPED | OUTPATIENT
Start: 2024-06-26

## 2024-06-26 NOTE — PROGRESS NOTES
"The ABCs of the Annual Wellness Visit  Subsequent Medicare Wellness Visit    Subjective      Charisma Maya is a 69 y.o. female who presents for a Subsequent Medicare Wellness Visit and to follow-up on chronic conditions.    She reports having vision changes, L eye \"foggy\" has an appt next week at [facility].     Hx of Breast cancer/bilateral mastectomy, w/ recurrence in the chest wall. She underwent radiation/chemo with bowel disturbance, required illeostomy, then J pouch. She did follow with oncology, Dr. Palomares yearly-needs new referral, gastro every 2 years, sigmoidoscopy now Q 7 to 8 years. She reports frequent chronic diarrhea, trying to eat more protein, tried Ensure but could not tolerate, worsened diarrhea.      Arthritis of multiple joints, ankles, hips, knees, stable on Celebrex and tramadol as needed, no longer follows with rheumatology.      Osteoporosis, on fosamax weekly and calcium, last DEXA 6/28/2022     Hyperlipidemia, stable labs in April. She denies GI upset, fatigue, chest pain/pressure, exercise intolerance, dyspnea, palpitations, syncope and pedal edema     Anxiety, she stays stressed, typically does not eat as much when under stress, she has lost 6lbs, she follows with psychiatry Dr. Castro, on sertraline and alprazolam as needed. She mostly sleeps ok, arthritis wakes her at times.        The following portions of the patient's history were reviewed and   updated as appropriate: allergies, current medications, past family history, past medical history, past social history, past surgical history, and problem list.    Compared to one year ago, the patient feels her physical   health is the same.    Compared to one year ago, the patient feels her mental   health is the same.    Recent Hospitalizations:  She was not admitted to the hospital during the last year.       Current Medical Providers:  Patient Care Team:  Katt Dumont APRN as PCP - General (Nurse Practitioner)    Outpatient " Medications Prior to Visit   Medication Sig Dispense Refill    ALPRAZolam (XANAX) 0.25 MG tablet Take 1 tablet by mouth 3 (Three) Times a Day As Needed.      calcium carbonate (OS-TOSIN) 1250 (500 Ca) MG tablet Take 1 tablet by mouth Daily.      celecoxib (CeleBREX) 200 MG capsule Take 1 capsule by mouth Daily. 90 capsule 3    Cholecalciferol 1000 units tablet Take 1 tablet by mouth Daily.      diphenhydrAMINE HCl (BENADRYL ALLERGY PO) Take  by mouth Daily As Needed.      sertraline (ZOLOFT) 100 MG tablet Take 2 tablets by mouth Daily. #45  0    acyclovir (ZOVIRAX) 800 MG tablet TAKE 1 TABLET BY MOUTH EVERY 8 HOURS FOR 10 DAYS 30 tablet 2    alendronate (FOSAMAX) 70 MG tablet Take 1 tablet by mouth Every 7 (Seven) Days. 12 tablet 1    traMADol (ULTRAM) 50 MG tablet TAKE 1 TABLET BY MOUTH EVERY 8 HOURS AS NEEDED FOR SEVERE PAIN 60 tablet 2     No facility-administered medications prior to visit.       Opioid medication/s are on active medication list.  and I have evaluated her active treatment plan and pain score trends (see table).  Vitals:    06/26/24 1515   PainSc: 0-No pain     I have reviewed the chart for potential of high risk medication and harmful drug interactions in the elderly.          Aspirin is not on active medication list.  Aspirin use is not indicated based on review of current medical condition/s. Risk of harm outweighs potential benefits.  .    Patient Active Problem List   Diagnosis    History of bilateral breast cancer    BRCA2 gene mutation positive in female hetro    Osteoporosis    Anxiety with depression    Encounter for general adult medical examination without abnormal findings    Fibromyalgia    Hx of ulcerative colitis    Migraine headache    Need for prophylactic vaccination against Streptococcus pneumoniae (pneumococcus)    Osteoarthritis    Weight loss     Advance Care Planning   Advance Care Planning     Advance Directive is not on file.  ACP discussion was held with the patient during  "this visit. Patient does not have an advance directive, information provided.     Objective    Vitals:    06/26/24 1515   BP: 117/69   BP Location: Left arm   Patient Position: Sitting   Cuff Size: Adult   Pulse: 85   Temp: 98.6 °F (37 °C)   TempSrc: Temporal   SpO2: 98%   Weight: 40.3 kg (88 lb 12.8 oz)   Height: 160 cm (63\")   PainSc: 0-No pain     Estimated body mass index is 15.73 kg/m² as calculated from the following:    Height as of this encounter: 160 cm (63\").    Weight as of this encounter: 40.3 kg (88 lb 12.8 oz).    BMI is below normal parameters (malnutrition). Recommendations: increase protein/fat in diet    Physical Exam  Constitutional:       General: She is not in acute distress.     Appearance: Normal appearance. She is well-developed and underweight. She is not ill-appearing or diaphoretic.   HENT:      Head: Normocephalic.   Eyes:      Conjunctiva/sclera: Conjunctivae normal.      Pupils: Pupils are equal, round, and reactive to light.   Neck:      Thyroid: No thyromegaly.      Vascular: No JVD.   Cardiovascular:      Rate and Rhythm: Normal rate and regular rhythm.      Heart sounds: Normal heart sounds. No murmur heard.  Pulmonary:      Effort: Pulmonary effort is normal. No respiratory distress.      Breath sounds: Normal breath sounds. No wheezing or rhonchi.   Abdominal:      General: Bowel sounds are normal. There is no distension.      Palpations: Abdomen is soft.      Tenderness: There is no abdominal tenderness.   Musculoskeletal:         General: No swelling or tenderness. Normal range of motion.      Cervical back: Normal range of motion and neck supple. No tenderness.   Lymphadenopathy:      Cervical: No cervical adenopathy.   Skin:     General: Skin is warm and dry.      Coloration: Skin is not jaundiced.      Findings: No erythema or rash.   Neurological:      General: No focal deficit present.      Mental Status: She is alert and oriented to person, place, and time. Mental status " is at baseline.      Sensory: No sensory deficit.   Psychiatric:         Mood and Affect: Mood normal.         Behavior: Behavior normal.         Thought Content: Thought content normal.         Judgment: Judgment normal.             Does the patient have evidence of cognitive impairment?   No    Lab Results   Component Value Date    TRIG 52 2024    HDL 92 (H) 2024     (H) 2024    VLDL 9 2024          HEALTH RISK ASSESSMENT    Smoking Status:  Social History     Tobacco Use   Smoking Status Never   Smokeless Tobacco Never     Alcohol Consumption:  Social History     Substance and Sexual Activity   Alcohol Use No     Fall Risk Screen:    STEADI Fall Risk Assessment was completed, and patient is at LOW risk for falls.Assessment completed on:2024    Depression Screenin/26/2024     3:17 PM   PHQ-2/PHQ-9 Depression Screening   Little Interest or Pleasure in Doing Things 0-->not at all   Feeling Down, Depressed or Hopeless 0-->not at all   PHQ-9: Brief Depression Severity Measure Score 0       Health Habits and Functional and Cognitive Screenin/26/2024     3:00 PM   Functional & Cognitive Status   Do you have difficulty preparing food and eating? No   Do you have difficulty bathing yourself, getting dressed or grooming yourself? No   Do you have difficulty using the toilet? No   Do you have difficulty moving around from place to place? No   Do you have trouble with steps or getting out of a bed or a chair? No   Current Diet Well Balanced Diet   Dental Exam Up to date   Eye Exam Not up to date   Exercise (times per week) 0 times per week   Current Exercises Include No Regular Exercise   Do you need help using the phone?  No   Are you deaf or do you have serious difficulty hearing?  No   Do you need help to go to places out of walking distance? No   Do you need help shopping? No   Do you need help preparing meals?  No   Do you need help with housework?  No   Do you need  help with laundry? No   Do you need help taking your medications? No   Do you need help managing money? No   Do you ever drive or ride in a car without wearing a seat belt? No   Have you felt unusual stress, anger or loneliness in the last month? No   Who do you live with? Spouse   If you need help, do you have trouble finding someone available to you? No   Have you been bothered in the last four weeks by sexual problems? No   Do you have difficulty concentrating, remembering or making decisions? No     ATTENTION  What is the year: correct  What is the month of the year: correct  What is the day of the week?: correct  What is the date?: correct  MEMORY  Repeat address three times, only score third attempt: [address] [place], [place]: 5  HOW MANY ANIMALS DID THE PATIENT NAME  Verbal Fluency -- Animal Names (0-25): 9-10  CLOCK DRAWING  Clock Drawing: All Correct  MEMORY RECALL  Tell me what you remember about that name and address we were repeating at the beginnin  ACE TOTAL SCORE  Total ACE Score - <25/30 strongly suggests cognitive impairment; <21/30 almost certainly shows dementia: 21      Age-appropriate Screening Schedule:  Refer to the list below for future screening recommendations based on patient's age, sex and/or medical conditions. Orders for these recommended tests are listed in the plan section. The patient has been provided with a written plan.    Health Maintenance   Topic Date Due    TDAP/TD VACCINES (1 - Tdap) Never done    RSV Vaccine - Adults (1 - 1-dose 60+ series) Never done    PAP SMEAR  Never done    ZOSTER VACCINE (2 of 2) 2019    Pneumococcal Vaccine 65+ (2 of 2 - PCV) 2023    COVID-19 Vaccine ( - - season) 2023    ANNUAL WELLNESS VISIT  2024    DXA SCAN  2024    INFLUENZA VACCINE  2024    LIPID PANEL  2025    BMI FOLLOWUP  2025    HEPATITIS C SCREENING  Completed    COLON CANCER SCREENING 5 YEAR SIGMOIDOSCOPY   Discontinued              CMS Preventative Services Quick Reference  Risk Factors Identified During Encounter:    Immunizations Discussed/Encouraged: Tdap, Shingrix, and RSV (Respiratory Syncytial Virus)  Vision Screening Recommended    The above risks/problems have been discussed with the patient.  Pertinent information has been shared with the patient in the After Visit Summary.    Diagnoses and all orders for this visit:    1. BRCA2 gene mutation positive in female hetro (Primary)  -     Ambulatory Referral to Hematology / Oncology    2. History of bilateral breast cancer  -     Ambulatory Referral to Hematology / Oncology    3. Medicare annual wellness visit, subsequent    4. Age-related osteoporosis without current pathological fracture  Comments:  DEXA up-to-date, continue Fosamax and calcium/D,   Orders:  -     alendronate (FOSAMAX) 70 MG tablet; Take 1 tablet by mouth Every 7 (Seven) Days.  Dispense: 12 tablet; Refill: 1    5. Arthritis  Comments:  Stable on Celebrex and tramadol as needed, refill tramadol today.  Orders:  -     traMADol (ULTRAM) 50 MG tablet; Take 1 tablet by mouth Every 8 (Eight) Hours As Needed for Severe Pain.  Dispense: 60 tablet; Refill: 2    6. Mixed hyperlipidemia    7. Weight loss    8. Anxiety with depression    9. Primary osteoarthritis involving multiple joints    Other orders  -     acyclovir (ZOVIRAX) 800 MG tablet; TAKE 1 TABLET BY MOUTH EVERY 8 HOURS FOR 10 DAYS  Dispense: 30 tablet; Refill: 2      Labs stable, check yearly  Conditions stable/unchanged  Cont with psych as directed  Age appropriate preventative counseling provided, including healthy lifestyle modifications and exercise      Follow Up:     Return in about 6 months (around 12/26/2024) for Recheck.    Next Medicare Wellness visit to be scheduled in 1 year.      An After Visit Summary and PPPS were made available to the patient.    EMR Dragon transcription disclaimer:  Part of this note may be an electronic  transcription/translation of spoken language to printed text using the Dragon Dictation System.

## 2024-07-10 ENCOUNTER — TELEPHONE (OUTPATIENT)
Dept: ONCOLOGY | Facility: CLINIC | Age: 69
End: 2024-07-10
Payer: MEDICARE

## 2024-07-15 ENCOUNTER — TELEPHONE (OUTPATIENT)
Dept: ONCOLOGY | Facility: CLINIC | Age: 69
End: 2024-07-15
Payer: MEDICARE

## 2024-07-18 ENCOUNTER — TELEPHONE (OUTPATIENT)
Dept: ONCOLOGY | Facility: CLINIC | Age: 69
End: 2024-07-18
Payer: MEDICARE

## 2024-08-28 DIAGNOSIS — M19.90 ARTHRITIS: ICD-10-CM

## 2024-08-30 RX ORDER — TRAMADOL HYDROCHLORIDE 50 MG/1
50 TABLET ORAL EVERY 8 HOURS PRN
Qty: 60 TABLET | Refills: 2 | Status: SHIPPED | OUTPATIENT
Start: 2024-08-30

## 2024-11-11 DIAGNOSIS — M19.90 ARTHRITIS: ICD-10-CM

## 2024-11-12 RX ORDER — TRAMADOL HYDROCHLORIDE 50 MG/1
50 TABLET ORAL EVERY 8 HOURS PRN
Qty: 60 TABLET | Refills: 2 | Status: SHIPPED | OUTPATIENT
Start: 2024-11-12

## 2025-01-20 DIAGNOSIS — M19.90 ARTHRITIS: ICD-10-CM

## 2025-01-20 RX ORDER — TRAMADOL HYDROCHLORIDE 50 MG/1
50 TABLET ORAL EVERY 8 HOURS PRN
Qty: 60 TABLET | Refills: 2 | Status: SHIPPED | OUTPATIENT
Start: 2025-01-20

## 2025-04-01 DIAGNOSIS — M19.90 ARTHRITIS: ICD-10-CM

## 2025-04-03 RX ORDER — TRAMADOL HYDROCHLORIDE 50 MG/1
50 TABLET ORAL EVERY 8 HOURS PRN
Qty: 60 TABLET | Refills: 2 | Status: SHIPPED | OUTPATIENT
Start: 2025-04-03

## 2025-05-27 ENCOUNTER — OFFICE VISIT (OUTPATIENT)
Dept: FAMILY MEDICINE CLINIC | Facility: CLINIC | Age: 70
End: 2025-05-27
Payer: MEDICARE

## 2025-05-27 VITALS
BODY MASS INDEX: 15.77 KG/M2 | HEIGHT: 63 IN | DIASTOLIC BLOOD PRESSURE: 83 MMHG | WEIGHT: 89 LBS | SYSTOLIC BLOOD PRESSURE: 125 MMHG | HEART RATE: 118 BPM | OXYGEN SATURATION: 97 % | TEMPERATURE: 98.4 F

## 2025-05-27 DIAGNOSIS — Z23 NEED FOR PNEUMOCOCCAL VACCINATION: ICD-10-CM

## 2025-05-27 DIAGNOSIS — M81.0 AGE-RELATED OSTEOPOROSIS WITHOUT CURRENT PATHOLOGICAL FRACTURE: ICD-10-CM

## 2025-05-27 DIAGNOSIS — M19.90 ARTHRITIS: ICD-10-CM

## 2025-05-27 DIAGNOSIS — Z85.3 HISTORY OF BILATERAL BREAST CANCER: Primary | ICD-10-CM

## 2025-05-27 DIAGNOSIS — Z23 NEED FOR PNEUMOCOCCAL 20-VALENT CONJUGATE VACCINATION: ICD-10-CM

## 2025-05-27 DIAGNOSIS — E78.2 MIXED HYPERLIPIDEMIA: ICD-10-CM

## 2025-05-27 RX ORDER — ACYCLOVIR 800 MG/1
TABLET ORAL
Qty: 30 TABLET | Refills: 2 | Status: SHIPPED | OUTPATIENT
Start: 2025-05-27

## 2025-05-27 RX ORDER — CELECOXIB 200 MG/1
200 CAPSULE ORAL 2 TIMES DAILY
Qty: 180 CAPSULE | Refills: 3 | Status: SHIPPED | OUTPATIENT
Start: 2025-05-27

## 2025-05-27 RX ORDER — ALENDRONATE SODIUM 70 MG/1
70 TABLET ORAL
Qty: 12 TABLET | Refills: 3 | Status: SHIPPED | OUTPATIENT
Start: 2025-05-27

## 2025-05-27 NOTE — PROGRESS NOTES
Chief Complaint  Chief Complaint   Patient presents with    Pain     All over arthritic pain, worse in ankles and hips, lower back          Subjective          Charisma Maya presents to [facility] for   History of Present Illness    Pt missed 6mo f/u in December, she complains of worsening of Arthritis pain, multiple joints, ankles, hips, knees, stable on Celebrex and tramadol as needed, no longer follows with rheumatology.    Hx of Breast cancer/bilateral mastectomy, w/ recurrence in the chest wall. She underwent radiation/chemo with bowel disturbance, required illeostomy, then J pouch. She did follow with oncology, Dr. Palomares yearly-needs new referral, gastro every 2 years, sigmoidoscopy now Q 7 to 8 years. She reports frequent chronic diarrhea, trying to eat more protein, tried Ensure but could not tolerate, worsened diarrhea.       Osteoporosis, on fosamax weekly and calcium, last DEXA 6/28/2022     Hyperlipidemia, stable, she denies GI upset, fatigue, chest pain/pressure, exercise intolerance, dyspnea, palpitations, syncope and pedal edema     Anxiety, she stays stressed, currently doing well, typically has decreased appetite when under stress, she has gained a lb. She follows with psychiatry Dr. Castro, on sertraline and alprazolam as needed. She mostly sleeps ok, arthritis wakes her at times.        The following portions of the patient's history were reviewed and updated as appropriate: allergies, current medications, past family history, past medical history, past social history, past surgical history and problem list.    Past Medical History:   Diagnosis Date    Breast cancer, BRCA2 positive     Chronic lung disease     Disorder of vitamin B12     Emphysema lung     History of kidney stones     Migraine headache     Osteonecrosis 2015    Osteoporosis 2002    Osteosclerosis     Seasonal allergies     Ulcerative colitis      Past Surgical History:   Procedure Laterality Date     CARPAL TUNNEL RELEASE Bilateral 2000    CHEST WALL MASS EXCISION Right 04/1993    2 knots removed from chest wall    COLECTOMY PARTIAL / TOTAL  02/1992    and ileostomy by Dr. Goldstein because of rupture of bowel Feb. 1992    CREATION / REVISION OF ILEOSTOMY / JEJUNOSTOMY      reversal of ileostomy and J-pouch placement-Dr. Sesay    IMPLANTATION / REPLACEMENT INFUSION PUMP  2002    placement and removal in 2002    MASTECTOMY Bilateral     OOPHORECTOMY  1996    Uterus intact    TRIGGER FINGER RELEASE Right 2002    right thumb trigger release    TUBAL ABDOMINAL LIGATION  1991     Family History   Problem Relation Age of Onset    Breast cancer Mother         passed away from metastasis    Diabetes Daughter      Social History     Tobacco Use    Smoking status: Never    Smokeless tobacco: Never   Substance Use Topics    Alcohol use: No       Current Outpatient Medications:     acyclovir (ZOVIRAX) 800 MG tablet, TAKE 1 TABLET BY MOUTH EVERY 8 HOURS FOR 10 DAYS, Disp: 30 tablet, Rfl: 2    alendronate (FOSAMAX) 70 MG tablet, Take 1 tablet by mouth Every 7 (Seven) Days., Disp: 12 tablet, Rfl: 3    ALPRAZolam (XANAX) 0.25 MG tablet, Take 1 tablet by mouth 3 (Three) Times a Day As Needed., Disp: , Rfl:     calcium carbonate (OS-TOSIN) 1250 (500 Ca) MG tablet, Take 1 tablet by mouth Daily., Disp: , Rfl:     celecoxib (CeleBREX) 200 MG capsule, Take 1 capsule by mouth 2 (Two) Times a Day., Disp: 180 capsule, Rfl: 3    Cholecalciferol 1000 units tablet, Take 1 tablet by mouth Daily., Disp: , Rfl:     diphenhydrAMINE HCl (BENADRYL ALLERGY PO), Take  by mouth Daily As Needed., Disp: , Rfl:     sertraline (ZOLOFT) 100 MG tablet, Take 2 tablets by mouth Daily. #45, Disp: , Rfl: 0    traMADol (ULTRAM) 50 MG tablet, TAKE 1 TABLET BY MOUTH EVERY 8 HOURS AS NEEDED FOR SEVERE PAIN, Disp: 60 tablet, Rfl: 2    Objective   Vital Signs:   Vitals:    05/27/25 1510   BP: 125/83   BP Location: Left arm   Patient Position: Sitting   Cuff  "Size: Small Adult   Pulse: 118   Temp: 98.4 °F (36.9 °C)   TempSrc: Oral   SpO2: 97%   Weight: 40.4 kg (89 lb)   Height: 160 cm (63\")   PainSc: 7    PainLoc: Generalized       Body mass index is 15.77 kg/m².    Physical Exam  Constitutional:       General: She is not in acute distress.     Appearance: Normal appearance. She is well-developed. She is not ill-appearing or diaphoretic.   HENT:      Head: Normocephalic.   Eyes:      Conjunctiva/sclera: Conjunctivae normal.      Pupils: Pupils are equal, round, and reactive to light.   Neck:      Thyroid: No thyromegaly.      Vascular: No JVD.   Cardiovascular:      Rate and Rhythm: Normal rate and regular rhythm.      Heart sounds: Normal heart sounds. No murmur heard.  Pulmonary:      Effort: Pulmonary effort is normal. No respiratory distress.      Breath sounds: Normal breath sounds. No wheezing or rhonchi.   Abdominal:      General: Bowel sounds are normal. There is no distension.      Palpations: Abdomen is soft.      Tenderness: There is no abdominal tenderness.   Musculoskeletal:         General: Tenderness (OA multi joints) present. No swelling. Normal range of motion.      Cervical back: Normal range of motion and neck supple. No tenderness.   Lymphadenopathy:      Cervical: No cervical adenopathy.   Skin:     General: Skin is warm and dry.      Coloration: Skin is not jaundiced.      Findings: No erythema or rash.   Neurological:      General: No focal deficit present.      Mental Status: She is alert and oriented to person, place, and time. Mental status is at baseline.      Sensory: No sensory deficit.      Motor: No weakness.      Gait: Gait normal.   Psychiatric:         Mood and Affect: Mood normal.         Behavior: Behavior normal.         Thought Content: Thought content normal.         Judgment: Judgment normal.          Result Review :     No visits with results within 7 Day(s) from this visit.   Latest known visit with results is:   Orders Only on " 04/18/2024   Component Date Value Ref Range Status    WBC 06/25/2024 7.97  3.40 - 10.80 10*3/mm3 Final    RBC 06/25/2024 4.19  3.77 - 5.28 10*6/mm3 Final    Hemoglobin 06/25/2024 13.3  12.0 - 15.9 g/dL Final    Hematocrit 06/25/2024 41.1  34.0 - 46.6 % Final    MCV 06/25/2024 98.1 (H)  79.0 - 97.0 fL Final    MCH 06/25/2024 31.7  26.6 - 33.0 pg Final    MCHC 06/25/2024 32.4  31.5 - 35.7 g/dL Final    RDW 06/25/2024 11.5 (L)  12.3 - 15.4 % Final    RDW-SD 06/25/2024 40.8  37.0 - 54.0 fl Final    MPV 06/25/2024 9.9  6.0 - 12.0 fL Final    Platelets 06/25/2024 306  140 - 450 10*3/mm3 Final    Glucose 06/25/2024 87  65 - 99 mg/dL Final    BUN 06/25/2024 14  8 - 23 mg/dL Final    Creatinine 06/25/2024 0.65  0.57 - 1.00 mg/dL Final    Sodium 06/25/2024 143  136 - 145 mmol/L Final    Potassium 06/25/2024 4.6  3.5 - 5.2 mmol/L Final    Chloride 06/25/2024 105  98 - 107 mmol/L Final    CO2 06/25/2024 27.0  22.0 - 29.0 mmol/L Final    Calcium 06/25/2024 9.5  8.6 - 10.5 mg/dL Final    Total Protein 06/25/2024 7.3  6.0 - 8.5 g/dL Final    Albumin 06/25/2024 4.4  3.5 - 5.2 g/dL Final    ALT (SGPT) 06/25/2024 17  1 - 33 U/L Final    AST (SGOT) 06/25/2024 24  1 - 32 U/L Final    Alkaline Phosphatase 06/25/2024 62  39 - 117 U/L Final    Total Bilirubin 06/25/2024 0.3  0.0 - 1.2 mg/dL Final    Globulin 06/25/2024 2.9  gm/dL Final    A/G Ratio 06/25/2024 1.5  g/dL Final    BUN/Creatinine Ratio 06/25/2024 21.5  7.0 - 25.0 Final    Anion Gap 06/25/2024 11.0  5.0 - 15.0 mmol/L Final    eGFR 06/25/2024 96.6  >60.0 mL/min/1.73 Final    Total Cholesterol 06/25/2024 214 (H)  0 - 200 mg/dL Final    Triglycerides 06/25/2024 52  0 - 150 mg/dL Final    HDL Cholesterol 06/25/2024 92 (H)  40 - 60 mg/dL Final    LDL Cholesterol  06/25/2024 113 (H)  0 - 100 mg/dL Final    VLDL Cholesterol 06/25/2024 9  5 - 40 mg/dL Final    LDL/HDL Ratio 06/25/2024 1.21   Final    TSH 06/25/2024 1.450  0.270 - 4.200 uIU/mL Final                  BMI is below normal  parameters (malnutrition). Recommendations: increase protein in diet           Assessment and Plan    Diagnoses and all orders for this visit:    1. History of bilateral breast cancer (Primary)    2. Arthritis  Comments:  increase Celebrex to take BID prn, cont tramadol, refill tramadol when needed.  Orders:  -     celecoxib (CeleBREX) 200 MG capsule; Take 1 capsule by mouth 2 (Two) Times a Day.  Dispense: 180 capsule; Refill: 3    3. Age-related osteoporosis without current pathological fracture  Comments:  DEXA due, continue Fosamax and calcium/D  Orders:  -     alendronate (FOSAMAX) 70 MG tablet; Take 1 tablet by mouth Every 7 (Seven) Days.  Dispense: 12 tablet; Refill: 3  -     DEXA Bone Density Axial; Future    4. Mixed hyperlipidemia    5. Need for pneumococcal 20-valent conjugate vaccination  -     Pneumococcal Conjugate Vaccine 20-Valent (PCV20)    6. Need for pneumococcal vaccination    Other orders  -     acyclovir (ZOVIRAX) 800 MG tablet; TAKE 1 TABLET BY MOUTH EVERY 8 HOURS FOR 10 DAYS  Dispense: 30 tablet; Refill: 2  -     Cancel: Pneumococcal Polysaccharide Vaccine 23-Valent Greater Than or Equal To 1yo Subcutaneous / IM      Overall doing well  Cont current med regimen, refills as above and when needed   Labs 2024 reviewed and stable       I spent 30 minutes caring for Charisma Maya on this date of service. This time includes time spent by me in the following activities: preparing for the visit, reviewing tests, performing a medically appropriate examination and/or evaluation , counseling and educating the patient/family/caregiver, ordering medications, tests, or procedures and documenting information in the medical record        Follow Up     Return in about 6 months (around 11/27/2025) for Recheck, Medicare Wellness. HTN panel prior to appt .  Patient was given instructions and counseling regarding her condition or for health maintenance advice. Please see specific information pulled into the AVS  if appropriate.        Part of this note may be an electronic transcription/translation of spoken language to printed text using the Dragon Dictation System

## 2025-06-10 ENCOUNTER — PATIENT MESSAGE (OUTPATIENT)
Dept: FAMILY MEDICINE CLINIC | Facility: CLINIC | Age: 70
End: 2025-06-10
Payer: MEDICARE

## 2025-06-16 DIAGNOSIS — M19.90 ARTHRITIS: ICD-10-CM

## 2025-06-16 RX ORDER — TRAMADOL HYDROCHLORIDE 50 MG/1
50 TABLET ORAL EVERY 8 HOURS PRN
Qty: 60 TABLET | Refills: 2 | Status: SHIPPED | OUTPATIENT
Start: 2025-06-16

## 2025-06-24 NOTE — TELEPHONE ENCOUNTER
Spoke with Charisma  regarding bone dexa scan order. Has not scheduled yet due to transportation. Will schedule at a later date.    Relay

## 2025-08-21 DIAGNOSIS — M19.90 ARTHRITIS: ICD-10-CM

## 2025-08-21 RX ORDER — TRAMADOL HYDROCHLORIDE 50 MG/1
50 TABLET ORAL EVERY 8 HOURS PRN
Qty: 60 TABLET | Refills: 2 | Status: SHIPPED | OUTPATIENT
Start: 2025-08-21